# Patient Record
Sex: FEMALE | Race: WHITE | URBAN - METROPOLITAN AREA
[De-identification: names, ages, dates, MRNs, and addresses within clinical notes are randomized per-mention and may not be internally consistent; named-entity substitution may affect disease eponyms.]

---

## 2017-10-02 ENCOUNTER — HISTORICAL (OUTPATIENT)
Dept: LAB | Facility: HOSPITAL | Age: 62
End: 2017-10-02

## 2017-10-04 LAB — FINAL CULTURE: NO GROWTH

## 2018-05-09 ENCOUNTER — HISTORICAL (OUTPATIENT)
Dept: ADMINISTRATIVE | Facility: HOSPITAL | Age: 63
End: 2018-05-09

## 2018-05-09 LAB
CHOLEST SERPL-MCNC: 232 MG/DL (ref 0–200)
CHOLEST/HDLC SERPL: 3.7 {RATIO} (ref 0–4)
HDLC SERPL-MCNC: 63 MG/DL (ref 35–60)
LDLC SERPL CALC-MCNC: 145 MG/DL (ref 0–129)
TRIGL SERPL-MCNC: 118 MG/DL (ref 30–150)
VLDLC SERPL CALC-MCNC: 24 MG/DL

## 2018-11-07 ENCOUNTER — HISTORICAL (OUTPATIENT)
Dept: ADMINISTRATIVE | Facility: HOSPITAL | Age: 63
End: 2018-11-07

## 2018-11-07 LAB
CHOLEST SERPL-MCNC: 239 MG/DL (ref 0–200)
CHOLEST/HDLC SERPL: 3.4 {RATIO} (ref 0–4)
HDLC SERPL-MCNC: 71 MG/DL (ref 35–60)
LDLC SERPL CALC-MCNC: 151 MG/DL (ref 0–129)
TRIGL SERPL-MCNC: 83 MG/DL (ref 30–150)
VLDLC SERPL CALC-MCNC: 17 MG/DL

## 2018-11-09 ENCOUNTER — HISTORICAL (OUTPATIENT)
Dept: ADMINISTRATIVE | Facility: HOSPITAL | Age: 63
End: 2018-11-09

## 2019-01-09 LAB
ABS NEUT (OLG): 2.85 X10(3)/MCL (ref 2.1–9.2)
ERYTHROCYTE [DISTWIDTH] IN BLOOD BY AUTOMATED COUNT: 12.3 % (ref 11.5–17)
HCT VFR BLD AUTO: 37.1 % (ref 37–47)
HGB BLD-MCNC: 11.8 GM/DL (ref 12–16)
MCH RBC QN AUTO: 30.2 PG (ref 27–31)
MCHC RBC AUTO-ENTMCNC: 31.8 GM/DL (ref 33–36)
MCV RBC AUTO: 94.9 FL (ref 80–94)
PLATELET # BLD AUTO: 277 X10(3)/MCL (ref 130–400)
PMV BLD AUTO: 9.7 FL (ref 7.4–10.4)
RBC # BLD AUTO: 3.91 X10(6)/MCL (ref 4.2–5.4)
WBC # SPEC AUTO: 6.2 X10(3)/MCL (ref 4.5–11.5)

## 2019-01-14 ENCOUNTER — HISTORICAL (OUTPATIENT)
Dept: SURGERY | Facility: HOSPITAL | Age: 64
End: 2019-01-14

## 2019-10-03 ENCOUNTER — HISTORICAL (OUTPATIENT)
Dept: ADMINISTRATIVE | Facility: HOSPITAL | Age: 64
End: 2019-10-03

## 2019-10-03 LAB
CHOLEST SERPL-MCNC: 222 MG/DL (ref 0–200)
CHOLEST/HDLC SERPL: 3.4 {RATIO} (ref 0–4)
HDLC SERPL-MCNC: 65 MG/DL (ref 35–60)
LDLC SERPL CALC-MCNC: 145 MG/DL (ref 0–129)
TRIGL SERPL-MCNC: 61 MG/DL (ref 30–150)
VLDLC SERPL CALC-MCNC: 12 MG/DL

## 2019-12-05 ENCOUNTER — HISTORICAL (OUTPATIENT)
Dept: ADMINISTRATIVE | Facility: HOSPITAL | Age: 64
End: 2019-12-05

## 2019-12-05 LAB
FLUAV AG NPH QL IA: NEGATIVE
FLUBV AG NPH QL IA: NEGATIVE

## 2020-11-23 LAB
BUN SERPL-MCNC: 15 MG/DL (ref 7–18)
CALCIUM SERPL-MCNC: 9.8 MG/DL (ref 8.5–10.1)
CHLORIDE SERPL-SCNC: 103 MMOL/L (ref 98–107)
CO2 SERPL-SCNC: 26 MMOL/L (ref 21–32)
CREAT SERPL-MCNC: 0.9 MG/DL (ref 0.6–1.3)
GLUCOSE SERPL-MCNC: 90 MG/DL (ref 74–106)
POTASSIUM SERPL-SCNC: 3.9 MMOL/L (ref 3.5–5.1)
SODIUM SERPL-SCNC: 139 MEQ/L (ref 131–145)

## 2021-02-26 ENCOUNTER — HISTORICAL (OUTPATIENT)
Dept: ADMINISTRATIVE | Facility: HOSPITAL | Age: 66
End: 2021-02-26

## 2021-02-26 LAB
HEMOCCULT SP1 STL QL: NEGATIVE
HEMOCCULT SP2 STL QL: NEGATIVE

## 2021-12-10 ENCOUNTER — HISTORICAL (OUTPATIENT)
Dept: ADMINISTRATIVE | Facility: HOSPITAL | Age: 66
End: 2021-12-10

## 2021-12-10 LAB
ALBUMIN SERPL-MCNC: 3.5 GM/DL (ref 3.4–4.8)
ALBUMIN/GLOB SERPL: 1.2 RATIO (ref 1.1–2)
ALP SERPL-CCNC: 63 UNIT/L (ref 40–150)
ALT SERPL-CCNC: 19 UNIT/L (ref 0–55)
AST SERPL-CCNC: 21 UNIT/L (ref 5–34)
BILIRUB SERPL-MCNC: 0.4 MG/DL
BILIRUBIN DIRECT+TOT PNL SERPL-MCNC: 0.2 MG/DL (ref 0–0.5)
BILIRUBIN DIRECT+TOT PNL SERPL-MCNC: 0.2 MG/DL (ref 0–0.8)
BUN SERPL-MCNC: 8.2 MG/DL (ref 9.8–20.1)
CALCIUM SERPL-MCNC: 9.1 MG/DL (ref 8.7–10.5)
CHLORIDE SERPL-SCNC: 99 MMOL/L (ref 98–107)
CO2 SERPL-SCNC: 30 MMOL/L (ref 23–31)
CREAT SERPL-MCNC: 0.73 MG/DL (ref 0.55–1.02)
ERYTHROCYTE [DISTWIDTH] IN BLOOD BY AUTOMATED COUNT: 11.9 % (ref 11.5–17)
GLOBULIN SER-MCNC: 3 GM/DL (ref 2.4–3.5)
GLUCOSE SERPL-MCNC: 90 MG/DL (ref 82–115)
HCT VFR BLD AUTO: 37.3 % (ref 37–47)
HGB BLD-MCNC: 11.9 GM/DL (ref 12–16)
MCH RBC QN AUTO: 29.5 PG (ref 27–31)
MCHC RBC AUTO-ENTMCNC: 31.9 GM/DL (ref 33–36)
MCV RBC AUTO: 92.6 FL (ref 80–94)
PLATELET # BLD AUTO: 413 X10(3)/MCL (ref 130–400)
PMV BLD AUTO: 9.5 FL (ref 9.4–12.4)
POTASSIUM SERPL-SCNC: 4 MMOL/L (ref 3.5–5.1)
PROT SERPL-MCNC: 6.5 GM/DL (ref 5.8–7.6)
RBC # BLD AUTO: 4.03 X10(6)/MCL (ref 4.2–5.4)
SODIUM SERPL-SCNC: 138 MMOL/L (ref 136–145)
T3FREE SERPL-MCNC: 1.92 PG/ML (ref 1.58–3.91)
T3RU NFR SERPL: 34.92 % (ref 31–39)
T4 FREE SERPL-MCNC: 0.96 NG/DL (ref 0.7–1.48)
T4 SERPL-MCNC: 7.34 UG/DL (ref 4.87–11.72)
TSH SERPL-ACNC: 1.04 UIU/ML (ref 0.35–4.94)
WBC # SPEC AUTO: 6.8 X10(3)/MCL (ref 4.5–11.5)

## 2022-03-04 ENCOUNTER — HISTORICAL (OUTPATIENT)
Dept: ADMINISTRATIVE | Facility: HOSPITAL | Age: 67
End: 2022-03-04

## 2022-04-10 ENCOUNTER — HISTORICAL (OUTPATIENT)
Dept: ADMINISTRATIVE | Facility: HOSPITAL | Age: 67
End: 2022-04-10

## 2022-04-27 VITALS
WEIGHT: 113.75 LBS | HEIGHT: 63 IN | SYSTOLIC BLOOD PRESSURE: 122 MMHG | OXYGEN SATURATION: 99 % | DIASTOLIC BLOOD PRESSURE: 70 MMHG | BODY MASS INDEX: 20.16 KG/M2

## 2022-05-03 NOTE — HISTORICAL [facility] CERNER
This is a historical note converted from [place]. Formatting and pictures may have been removed.  Please reference [place] for original formatting and attached multimedia. Chief Complaint  PT HERE FOR RT THUMB PAIN, SHE HAS PAIN AND POPPING WHEN BENDING HER THUMB, X-RAY TODAY...CV  History of Present Illness  Patient here with complaints of right thumb pain and?popping.  She reports approximately a week to 10 days ago she started having some popping in her finger and some swelling?and last night she used some?treatments with heat and ice and the swelling is going down some but still remains with a pop in her finger.  She?reports no traumatic incidents?to her recollection  Review of Systems  Constitutional: No fever, No chills.  Respiratory: No shortness of breath, No cough.  Cardiovascular: No chest pain.  Gastrointestinal: No nausea, No vomiting, No diarrhea, No constipation, No heartburn.  Genitourinary: No dysuria, No hematuria.  Hematology/Lymphatics: No bleeding tendency.  Endocrine: No polyuria.  Neurologic: Alert and oriented X4, No numbness, No tingling.  Psychiatric: No anxiety, No depression.  Integumentary: ?negative except as documented in history of present illness  Physical Exam  Vitals & Measurements  HR:?64(Peripheral)? BP:?107/64?  HT:?160?cm? HT:?160?cm? WT:?48.98?kg? WT:?48.98?kg? BMI:?19.13?  Right hand exam  Patient has negative grind test  Negative tenderness palpation across the CMC joint  Palpable flexor tendon nodule at the base of the right thumb  Exquisite tenderness palpation of the flexor tendon nodule  Positive triggering of the right thumb  No pain?to palpation of the MCP or IP joint of the thumb  Intact sensation to the?right hand  Negative Tinel  ?  Right thumb x-rays taken today  Degenerative changes of the IP joint of the right thumb?otherwise no bony a normalities  ?   Diagnosis: Right trigger thumb  Plan: The patient does not wish to try an injection at todays visit.  We will try a  course of anti-inflammatories along with a thumb spica brace?for rest of her?right thumb.  She will follow-up in this office in 3 weeks and if her popping and pain persist we will consider an injection at that point  Assessment/Plan  1.?Trigger thumb, right thumb?M65.311  Ordered:  meloxicam, 7.5 mg = 1 tab(s), Oral, Daily, # 30 tab(s), 1 Refill(s), Pharmacy: [facility]  [place]  Clinic Follow up, *Est. 11/30/18 8:00:00 CST, Order for future visit, Trigger thumb, right thumb, [facility]  External Referral, DME, Thumb spica brace right hand/wrist, 11/09/18 9:21:00 CST, Trigger thumb, right thumb  Office/Outpatient Visit Level 3 [id number], Trigger thumb, right thumb, [facility], 11/09/18 9:21:00 CST  ?  Pain of right thumb?M79.644  ?   Problem List/Past Medical History  Ongoing  Hypercholesteremia  Hypothyroidism  Migraine  Palpitations  Trigger thumb, right thumb  Historical  No qualifying data  Procedure/Surgical History  D&C - Dilatation and curettage   Medications  Alendronate Sodium 70 Mg Tab, 70 mg= 1 tab(s), Oral, qWeek  Fioricet oral capsule, 1 cap(s), Oral, q4hr, 1 refills  meloxicam 7.5 mg oral tablet, 7.5 mg= 1 tab(s), Oral, Daily, 1 refills  Oracea 40 mg oral delayed release capsule, 40 mg= 1 cap(s), Oral, Once  UNITHROID 0.05MG (50MCG)TABLETS  UNITHROID 0.075MG (75MCG)TABLETS, 75 mcg= 1 tab(s), Oral  VIT D2 1.25 MG (50,000 UNIT)  Allergies  Zithromax Z-Ramesh?(Unknown)  Social History  Alcohol  Current, 1-2 times per year, 11/06/2018  Employment/School  Retired, Previous employment/school: LPN., 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  Hyperlipidemia.: Mother and Father.  Hypertension.: Mother and Father.  Health Maintenance  Health Maintenance  ???Pending?(in the next year)  ??? ??Due?  ??? ? ? ?ADL Screening due??11/09/18??and every  1??year(s)  ??? ? ? ?Alcohol Misuse Screening due??11/09/18??and every 1??year(s)  ??? ? ? ?Aspirin Therapy for CVD Prevention due??11/09/18??and every 1??year(s)  ??? ? ? ?Tetanus Vaccine due??11/09/18??and every 10??year(s)  ??? ? ? ?Zoster Vaccine due??11/09/18??and every 100??year(s)  ???Satisfied?(in the past 1 year)  ??? ??Satisfied?  ??? ? ? ?Blood Pressure Screening on??11/09/18.??Satisfied by Antonia Napoles LPN  ??? ? ? ?Body Mass Index Check on??11/09/18.??Satisfied by Antonia Napoles LPN  ??? ? ? ?Cervical Cancer Screening on??02/21/18.??Satisfied by Bridget Erazo  ??? ? ? ?Depression Screening on??11/09/18.??Satisfied by Antonia Napoles LPN  ??? ? ? ?Influenza Vaccine on??11/09/18.??Satisfied by Antonia Napoles LPN  ??? ? ? ?Lipid Screening on??11/07/18.??Satisfied by Tamika Cartagena MT  ??? ? ? ?Obesity Screening on??11/09/18.??Satisfied by Antonia Napoles LPN  ?  ?

## 2022-09-17 ENCOUNTER — HISTORICAL (OUTPATIENT)
Dept: ADMINISTRATIVE | Facility: HOSPITAL | Age: 67
End: 2022-09-17

## 2023-08-03 ENCOUNTER — LAB REQUISITION (OUTPATIENT)
Dept: LAB | Facility: HOSPITAL | Age: 68
End: 2023-08-03
Payer: COMMERCIAL

## 2023-08-03 DIAGNOSIS — L30.9 DERMATITIS, UNSPECIFIED: ICD-10-CM

## 2023-08-03 PROCEDURE — 87070 CULTURE OTHR SPECIMN AEROBIC: CPT | Performed by: PHYSICIAN ASSISTANT

## 2023-08-06 LAB — BACTERIA SPEC CULT: NORMAL

## 2023-09-06 NOTE — OP NOTE
DATE OF SURGERY:    01/14/2019    SURGEON:  Juan Pablo Bloom MD  ASSISTANT:  Bk Davis    PREOPERATIVE DIAGNOSIS:  Right trigger thumb.    POSTOPERATIVE DIAGNOSIS:  Right trigger thumb.    OPERATION PERFORMED:  Right trigger thumb release.    PROCEDURE IN DETAIL:  The patient was brought to the operating room and placed on the operative table under general anesthetic.  The right upper extremity was prepped for 10 minutes with Betadine scrub brush, painted with Betadine antiseptic solution, and draped out sterilely.  The arm was exsanguinated and tourniquet was inflated to 250 mmHg.  An incision was made at the base of the thumb, dissecting down.  The annular pulley was identified.  It was freed.  The constriction was freed and the thumb was able to move without any further locking or popping.  The patient had local anesthetic introduced to the soft tissues.  Tourniquet was deflated.  Hemostasis was obtained.  The wound was then closed with interrupted nylon sutures.  A sterile dressing was then applied.  The patient was then taken from surgery to recovery in satisfactory condition.        ______________________________  Juan Pablo Bloom MD    JPS/UN  DD:  01/14/2019  Time:  08:33AM  DT:  01/14/2019  Time:  09:46AM  Job #:  [id number]      Chonodrocutaneous Helical Advancement Flap Text: The defect edges were debeveled with a #15 scalpel blade.  Given the location of the defect and the proximity to free margins a chondrocutaneous helical advancement flap was deemed most appropriate.  Using a sterile surgical marker, the appropriate advancement flap was drawn incorporating the defect and placing the expected incisions within the relaxed skin tension lines where possible.    The area thus outlined was incised deep to adipose tissue with a #15 scalpel blade.  The skin margins were undermined to an appropriate distance in all directions utilizing iris scissors.

## 2024-09-24 ENCOUNTER — HOSPITAL ENCOUNTER (INPATIENT)
Facility: HOSPITAL | Age: 69
LOS: 6 days | Discharge: HOME HEALTH | DRG: 457 | End: 2024-09-30
Attending: EMERGENCY MEDICINE | Admitting: STUDENT IN AN ORGANIZED HEALTH CARE EDUCATION/TRAINING PROGRAM
Payer: COMMERCIAL

## 2024-09-24 DIAGNOSIS — R53.1 GENERALIZED WEAKNESS: ICD-10-CM

## 2024-09-24 DIAGNOSIS — M54.41 ACUTE BILATERAL LOW BACK PAIN WITH BILATERAL SCIATICA: Primary | ICD-10-CM

## 2024-09-24 DIAGNOSIS — M54.42 ACUTE BILATERAL LOW BACK PAIN WITH BILATERAL SCIATICA: Primary | ICD-10-CM

## 2024-09-24 DIAGNOSIS — Z01.818 PRE-OP EVALUATION: ICD-10-CM

## 2024-09-24 DIAGNOSIS — S32.10XA CLOSED FRACTURE OF SACRUM, UNSPECIFIED PORTION OF SACRUM, INITIAL ENCOUNTER: ICD-10-CM

## 2024-09-24 LAB
ABORH RETYPE: NORMAL
ALBUMIN SERPL-MCNC: 3.5 G/DL (ref 3.4–4.8)
ALBUMIN/GLOB SERPL: 1.2 RATIO (ref 1.1–2)
ALP SERPL-CCNC: 190 UNIT/L (ref 40–150)
ALT SERPL-CCNC: 22 UNIT/L (ref 0–55)
AMPHET UR QL SCN: NEGATIVE
ANION GAP SERPL CALC-SCNC: 10 MEQ/L
APTT PPP: 22.7 SECONDS (ref 23.2–33.7)
AST SERPL-CCNC: 27 UNIT/L (ref 5–34)
BACTERIA #/AREA URNS AUTO: ABNORMAL /HPF
BARBITURATE SCN PRESENT UR: NEGATIVE
BASOPHILS # BLD AUTO: 0.05 X10(3)/MCL
BASOPHILS NFR BLD AUTO: 0.5 %
BENZODIAZ UR QL SCN: NEGATIVE
BILIRUB SERPL-MCNC: 0.4 MG/DL
BILIRUB UR QL STRIP.AUTO: NEGATIVE
BUN SERPL-MCNC: 14.6 MG/DL (ref 9.8–20.1)
CALCIUM SERPL-MCNC: 9.2 MG/DL (ref 8.4–10.2)
CANNABINOIDS UR QL SCN: NEGATIVE
CHLORIDE SERPL-SCNC: 98 MMOL/L (ref 98–107)
CK SERPL-CCNC: 165 U/L (ref 29–168)
CLARITY UR: CLEAR
CO2 SERPL-SCNC: 24 MMOL/L (ref 23–31)
COCAINE UR QL SCN: NEGATIVE
COLOR UR AUTO: COLORLESS
CREAT SERPL-MCNC: 0.67 MG/DL (ref 0.55–1.02)
CREAT/UREA NIT SERPL: 22
EOSINOPHIL # BLD AUTO: 0.03 X10(3)/MCL (ref 0–0.9)
EOSINOPHIL NFR BLD AUTO: 0.3 %
ERYTHROCYTE [DISTWIDTH] IN BLOOD BY AUTOMATED COUNT: 12 % (ref 11.5–17)
ETHANOL SERPL-MCNC: <10 MG/DL
FENTANYL UR QL SCN: NEGATIVE
GFR SERPLBLD CREATININE-BSD FMLA CKD-EPI: >60 ML/MIN/1.73/M2
GLOBULIN SER-MCNC: 2.9 GM/DL (ref 2.4–3.5)
GLUCOSE SERPL-MCNC: 97 MG/DL (ref 82–115)
GLUCOSE UR QL STRIP: NORMAL
GROUP & RH: NORMAL
HCT VFR BLD AUTO: 39.2 % (ref 37–47)
HGB BLD-MCNC: 12.9 G/DL (ref 12–16)
HGB UR QL STRIP: NEGATIVE
IMM GRANULOCYTES # BLD AUTO: 0.04 X10(3)/MCL (ref 0–0.04)
IMM GRANULOCYTES NFR BLD AUTO: 0.4 %
INDIRECT COOMBS: NORMAL
INR PPP: 0.9
KETONES UR QL STRIP: ABNORMAL
LEUKOCYTE ESTERASE UR QL STRIP: NEGATIVE
LYMPHOCYTES # BLD AUTO: 1.62 X10(3)/MCL (ref 0.6–4.6)
LYMPHOCYTES NFR BLD AUTO: 17.5 %
MAGNESIUM SERPL-MCNC: 2.2 MG/DL (ref 1.6–2.6)
MCH RBC QN AUTO: 29.9 PG (ref 27–31)
MCHC RBC AUTO-ENTMCNC: 32.9 G/DL (ref 33–36)
MCV RBC AUTO: 91 FL (ref 80–94)
MDMA UR QL SCN: NEGATIVE
MONOCYTES # BLD AUTO: 0.91 X10(3)/MCL (ref 0.1–1.3)
MONOCYTES NFR BLD AUTO: 9.8 %
NEUTROPHILS # BLD AUTO: 6.63 X10(3)/MCL (ref 2.1–9.2)
NEUTROPHILS NFR BLD AUTO: 71.5 %
NITRITE UR QL STRIP: NEGATIVE
NRBC BLD AUTO-RTO: 0 %
OPIATES UR QL SCN: POSITIVE
PCP UR QL: NEGATIVE
PH UR STRIP: 5.5 [PH]
PH UR: 5.5 [PH] (ref 3–11)
PLATELET # BLD AUTO: 476 X10(3)/MCL (ref 130–400)
PMV BLD AUTO: 8.1 FL (ref 7.4–10.4)
POCT GLUCOSE: 98 MG/DL (ref 70–110)
POTASSIUM SERPL-SCNC: 3.8 MMOL/L (ref 3.5–5.1)
PROT SERPL-MCNC: 6.4 GM/DL (ref 5.8–7.6)
PROT UR QL STRIP: NEGATIVE
PROTHROMBIN TIME: 12.6 SECONDS (ref 12.5–14.5)
RBC # BLD AUTO: 4.31 X10(6)/MCL (ref 4.2–5.4)
RBC #/AREA URNS AUTO: ABNORMAL /HPF
SODIUM SERPL-SCNC: 132 MMOL/L (ref 136–145)
SP GR UR STRIP.AUTO: 1.01 (ref 1–1.03)
SPECIFIC GRAVITY, URINE AUTO (.000) (OHS): 1.01 (ref 1–1.03)
SPECIMEN OUTDATE: NORMAL
SQUAMOUS #/AREA URNS LPF: ABNORMAL /HPF
T3FREE SERPL-MCNC: 2.5 PG/ML (ref 1.58–3.91)
T4 FREE SERPL-MCNC: 1.15 NG/DL (ref 0.7–1.48)
TROPONIN I SERPL-MCNC: <0.01 NG/ML (ref 0–0.04)
TSH SERPL-ACNC: 5.24 UIU/ML (ref 0.35–4.94)
UROBILINOGEN UR STRIP-ACNC: NORMAL
WBC # BLD AUTO: 9.28 X10(3)/MCL (ref 4.5–11.5)
WBC #/AREA URNS AUTO: ABNORMAL /HPF

## 2024-09-24 PROCEDURE — 82962 GLUCOSE BLOOD TEST: CPT

## 2024-09-24 PROCEDURE — 85610 PROTHROMBIN TIME: CPT | Performed by: EMERGENCY MEDICINE

## 2024-09-24 PROCEDURE — 82077 ASSAY SPEC XCP UR&BREATH IA: CPT | Performed by: EMERGENCY MEDICINE

## 2024-09-24 PROCEDURE — 86850 RBC ANTIBODY SCREEN: CPT | Performed by: ORTHOPAEDIC SURGERY

## 2024-09-24 PROCEDURE — 96374 THER/PROPH/DIAG INJ IV PUSH: CPT

## 2024-09-24 PROCEDURE — 81015 MICROSCOPIC EXAM OF URINE: CPT | Performed by: EMERGENCY MEDICINE

## 2024-09-24 PROCEDURE — 25000003 PHARM REV CODE 250: Performed by: PHYSICIAN ASSISTANT

## 2024-09-24 PROCEDURE — 82550 ASSAY OF CK (CPK): CPT | Performed by: EMERGENCY MEDICINE

## 2024-09-24 PROCEDURE — 96375 TX/PRO/DX INJ NEW DRUG ADDON: CPT

## 2024-09-24 PROCEDURE — 85730 THROMBOPLASTIN TIME PARTIAL: CPT | Performed by: EMERGENCY MEDICINE

## 2024-09-24 PROCEDURE — 86901 BLOOD TYPING SEROLOGIC RH(D): CPT | Performed by: ORTHOPAEDIC SURGERY

## 2024-09-24 PROCEDURE — 99285 EMERGENCY DEPT VISIT HI MDM: CPT | Mod: 25

## 2024-09-24 PROCEDURE — 63600175 PHARM REV CODE 636 W HCPCS: Performed by: EMERGENCY MEDICINE

## 2024-09-24 PROCEDURE — 11000001 HC ACUTE MED/SURG PRIVATE ROOM

## 2024-09-24 PROCEDURE — 80053 COMPREHEN METABOLIC PANEL: CPT | Performed by: EMERGENCY MEDICINE

## 2024-09-24 PROCEDURE — 84443 ASSAY THYROID STIM HORMONE: CPT | Performed by: EMERGENCY MEDICINE

## 2024-09-24 PROCEDURE — 84484 ASSAY OF TROPONIN QUANT: CPT | Performed by: EMERGENCY MEDICINE

## 2024-09-24 PROCEDURE — 84439 ASSAY OF FREE THYROXINE: CPT | Performed by: EMERGENCY MEDICINE

## 2024-09-24 PROCEDURE — 80307 DRUG TEST PRSMV CHEM ANLYZR: CPT | Performed by: EMERGENCY MEDICINE

## 2024-09-24 PROCEDURE — 25000003 PHARM REV CODE 250: Performed by: NURSE PRACTITIONER

## 2024-09-24 PROCEDURE — 86900 BLOOD TYPING SEROLOGIC ABO: CPT | Performed by: ORTHOPAEDIC SURGERY

## 2024-09-24 PROCEDURE — 83735 ASSAY OF MAGNESIUM: CPT | Performed by: EMERGENCY MEDICINE

## 2024-09-24 PROCEDURE — 85025 COMPLETE CBC W/AUTO DIFF WBC: CPT | Performed by: EMERGENCY MEDICINE

## 2024-09-24 PROCEDURE — 63600175 PHARM REV CODE 636 W HCPCS: Performed by: PHYSICIAN ASSISTANT

## 2024-09-24 PROCEDURE — 81001 URINALYSIS AUTO W/SCOPE: CPT | Performed by: EMERGENCY MEDICINE

## 2024-09-24 PROCEDURE — 84481 FREE ASSAY (FT-3): CPT | Performed by: EMERGENCY MEDICINE

## 2024-09-24 RX ORDER — IBUPROFEN 200 MG
16 TABLET ORAL
Status: DISCONTINUED | OUTPATIENT
Start: 2024-09-24 | End: 2024-09-30 | Stop reason: HOSPADM

## 2024-09-24 RX ORDER — MORPHINE SULFATE 4 MG/ML
2 INJECTION, SOLUTION INTRAMUSCULAR; INTRAVENOUS
Status: COMPLETED | OUTPATIENT
Start: 2024-09-24 | End: 2024-09-24

## 2024-09-24 RX ORDER — BUSPIRONE HYDROCHLORIDE 5 MG/1
5 TABLET ORAL 2 TIMES DAILY
COMMUNITY
Start: 2024-08-31

## 2024-09-24 RX ORDER — TALC
6 POWDER (GRAM) TOPICAL NIGHTLY PRN
Status: DISCONTINUED | OUTPATIENT
Start: 2024-09-24 | End: 2024-09-30 | Stop reason: HOSPADM

## 2024-09-24 RX ORDER — ACETAMINOPHEN 325 MG/1
650 TABLET ORAL EVERY 8 HOURS PRN
Status: DISCONTINUED | OUTPATIENT
Start: 2024-09-24 | End: 2024-09-30 | Stop reason: HOSPADM

## 2024-09-24 RX ORDER — HYDROCODONE BITARTRATE AND ACETAMINOPHEN 7.5; 325 MG/1; MG/1
1 TABLET ORAL EVERY 4 HOURS PRN
Status: DISPENSED | OUTPATIENT
Start: 2024-09-24 | End: 2024-09-26

## 2024-09-24 RX ORDER — ACETAMINOPHEN 325 MG/1
650 TABLET ORAL EVERY 4 HOURS PRN
Status: DISCONTINUED | OUTPATIENT
Start: 2024-09-24 | End: 2024-09-30 | Stop reason: HOSPADM

## 2024-09-24 RX ORDER — ENOXAPARIN SODIUM 100 MG/ML
40 INJECTION SUBCUTANEOUS EVERY 24 HOURS
Status: DISCONTINUED | OUTPATIENT
Start: 2024-09-24 | End: 2024-09-26

## 2024-09-24 RX ORDER — LEVOTHYROXINE SODIUM 50 UG/1
88 TABLET ORAL
COMMUNITY

## 2024-09-24 RX ORDER — GLUCAGON 1 MG
1 KIT INJECTION
Status: DISCONTINUED | OUTPATIENT
Start: 2024-09-24 | End: 2024-09-30 | Stop reason: HOSPADM

## 2024-09-24 RX ORDER — LEVOTHYROXINE SODIUM 50 UG/1
5 TABLET ORAL
COMMUNITY

## 2024-09-24 RX ORDER — BUSPIRONE HYDROCHLORIDE 5 MG/1
5 TABLET ORAL 2 TIMES DAILY
Status: DISCONTINUED | OUTPATIENT
Start: 2024-09-24 | End: 2024-09-30 | Stop reason: HOSPADM

## 2024-09-24 RX ORDER — IBUPROFEN 200 MG
24 TABLET ORAL
Status: DISCONTINUED | OUTPATIENT
Start: 2024-09-24 | End: 2024-09-30 | Stop reason: HOSPADM

## 2024-09-24 RX ORDER — SODIUM CHLORIDE 0.9 % (FLUSH) 0.9 %
10 SYRINGE (ML) INJECTION EVERY 12 HOURS PRN
Status: DISCONTINUED | OUTPATIENT
Start: 2024-09-24 | End: 2024-09-30 | Stop reason: HOSPADM

## 2024-09-24 RX ORDER — METHOCARBAMOL 100 MG/ML
1000 INJECTION, SOLUTION INTRAMUSCULAR; INTRAVENOUS ONCE
Status: COMPLETED | OUTPATIENT
Start: 2024-09-24 | End: 2024-09-24

## 2024-09-24 RX ORDER — ONDANSETRON HYDROCHLORIDE 2 MG/ML
4 INJECTION, SOLUTION INTRAVENOUS EVERY 4 HOURS PRN
Status: DISCONTINUED | OUTPATIENT
Start: 2024-09-24 | End: 2024-09-30 | Stop reason: HOSPADM

## 2024-09-24 RX ORDER — ONDANSETRON HYDROCHLORIDE 2 MG/ML
4 INJECTION, SOLUTION INTRAVENOUS
Status: COMPLETED | OUTPATIENT
Start: 2024-09-24 | End: 2024-09-24

## 2024-09-24 RX ADMIN — ENOXAPARIN SODIUM 40 MG: 40 INJECTION SUBCUTANEOUS at 07:09

## 2024-09-24 RX ADMIN — HYDROCODONE BITARTRATE AND ACETAMINOPHEN 1 TABLET: 7.5; 325 TABLET ORAL at 06:09

## 2024-09-24 RX ADMIN — MORPHINE SULFATE 2 MG: 4 INJECTION, SOLUTION INTRAMUSCULAR; INTRAVENOUS at 11:09

## 2024-09-24 RX ADMIN — METHOCARBAMOL 1000 MG: 100 INJECTION INTRAMUSCULAR; INTRAVENOUS at 11:09

## 2024-09-24 RX ADMIN — ONDANSETRON 4 MG: 2 INJECTION INTRAMUSCULAR; INTRAVENOUS at 11:09

## 2024-09-24 RX ADMIN — Medication 6 MG: at 11:09

## 2024-09-24 NOTE — ED PROVIDER NOTES
Encounter Date: 9/24/2024    SCRIBE #1 NOTE: I, Raoul Back, am scribing for, and in the presence of,  Katty Chávez MD. I have scribed the following portions of the note - Other sections scribed: HPI,ROS,PE.     History     Chief Complaint   Patient presents with    Hip Pain     Pt co back pain radiating to hips and knees. Pt standing, but states she can't walk for past month.    Altered Mental Status     Pt confused and slow to respond to answer questions.  states pt has not been sleeping. CBG 98mg/dl.      67 y/o female with PMHx of HLD, hypothyroidism, and anxiety presents to ED c/o bilateral hip pain onset ~1x month. Pt reports she has been having a burning pain to her hips, radiating down her thighs that gets worse throughout the day. She also complains of intermittent episodes of numbness to her posterior thighs and feet. She denies having any recent falls or trauma. She denies any difficulty urinating, but states it is more painful to sit down. She reports taking tylenol and advil for the pain. She reports she hasn't been sleeping well due to the pain. She reports she is eating and drinking normally.      at bedside reports pt pulled her pelvic floor muscles ~3x months ago, and has been doing physical therapy.  reports pt has been using a walker to ambulate, but has been more difficult due to her bilateral hip pain.  reports recently she has been crawling on the floor to get around, or needs to have help ambulating.     The history is provided by the patient and the spouse. No  was used.     Review of patient's allergies indicates:   Allergen Reactions    Beta-blockers (beta-adrenergic blocking agts)     Statins-hmg-coa reductase inhibitors      Past Medical History:   Diagnosis Date    Hypercholesteremia     Hypothyroidism, unspecified     Migraine     Palpitations     Trigger thumb, right thumb      Past Surgical History:   Procedure  Laterality Date    Caesarean section      COLONOSCOPY      D&C - Dilatation and curettage      History of esophagogastroduodenoscopy (EGD)      Release Right Hand Tendon, Open Approach (01/14/2019)      Release Trigger Finger Or Thumb (Right) (01/14/2019)      Tendon sheath incision (eg, for trigger finger) (01/14/2019)       No family history on file.  Social History     Tobacco Use    Smoking status: Never    Smokeless tobacco: Never   Substance Use Topics    Alcohol use: Yes     Alcohol/week: 1.0 standard drink of alcohol     Types: 1 Standard drinks or equivalent per week     Comment: Occasionally    Drug use: Never     Review of Systems   Constitutional:  Negative for fever.        Hasn't been sleeping well due to pain.    Genitourinary:  Negative for difficulty urinating.   Musculoskeletal:  Positive for arthralgias (a burning pain to bilateral hips radiating down thighs) and myalgias (a burning pain to bilateral hips radiating to thighs).   Neurological:  Positive for numbness (intermittent numbness to her BLE).       Physical Exam     Initial Vitals [09/24/24 0951]   BP Pulse Resp Temp SpO2   (!) 147/83 87 20 98.3 °F (36.8 °C) 99 %      MAP       --         Physical Exam    Nursing note and vitals reviewed.  Constitutional: No distress.   HENT:   Head: Normocephalic and atraumatic.   Eyes: Conjunctivae and EOM are normal. Pupils are equal, round, and reactive to light.   Neck: Trachea normal. Neck supple.   Normal range of motion.  Cardiovascular:  Normal rate, regular rhythm and normal heart sounds.           No murmur heard.  Intact distal pulses.    Pulmonary/Chest: Breath sounds normal. No respiratory distress. She exhibits no tenderness.   Abdominal: Abdomen is soft. Bowel sounds are normal. There is no abdominal tenderness.   Musculoskeletal:         General: No tenderness (no midline lumbar spine tenderness).      Cervical back: Normal range of motion and neck supple.      Lumbar back:  Normal. Normal range of motion.      Comments: ROM to BLE limited due to pain.      Neurological: She is alert and oriented to person, place, and time. She has normal strength. No cranial nerve deficit or sensory deficit.   Slow to answer questions.    Skin: Skin is warm and dry.   Psychiatric: Her mood appears anxious. Her speech is tangential.       ED Course   Procedures  Labs Reviewed   COMPREHENSIVE METABOLIC PANEL - Abnormal       Result Value    Sodium 132 (*)     Potassium 3.8      Chloride 98      CO2 24      Glucose 97      Blood Urea Nitrogen 14.6      Creatinine 0.67      Calcium 9.2      Protein Total 6.4      Albumin 3.5      Globulin 2.9      Albumin/Globulin Ratio 1.2      Bilirubin Total 0.4       (*)     ALT 22      AST 27      eGFR >60      Anion Gap 10.0      BUN/Creatinine Ratio 22     TSH - Abnormal    TSH 5.238 (*)    APTT - Abnormal    PTT 22.7 (*)    CBC WITH DIFFERENTIAL - Abnormal    WBC 9.28      RBC 4.31      Hgb 12.9      Hct 39.2      MCV 91.0      MCH 29.9      MCHC 32.9 (*)     RDW 12.0      Platelet 476 (*)     MPV 8.1      Neut % 71.5      Lymph % 17.5      Mono % 9.8      Eos % 0.3      Basophil % 0.5      Lymph # 1.62      Neut # 6.63      Mono # 0.91      Eos # 0.03      Baso # 0.05      IG# 0.04      IG% 0.4      NRBC% 0.0     TROPONIN I - Normal    Troponin-I <0.010     PROTIME-INR - Normal    PT 12.6      INR 0.9     MAGNESIUM - Normal    Magnesium Level 2.20     CK - Normal    Creatine Kinase 165     ALCOHOL,MEDICAL (ETHANOL) - Normal    Ethanol Level <10.0     T4, FREE - Normal    Thyroxine Free 1.15     T3,FREE ([facility] ONLY) - Normal    T3 Free 2.50     CBC W/ AUTO DIFFERENTIAL    Narrative:     The following orders were created for panel order CBC auto differential.  Procedure                               Abnormality         Status                     ---------                               -----------         ------                     CBC with  Differential[[id number]]       Abnormal            Final result                 Please view results for these tests on the individual orders.   URINALYSIS, REFLEX TO URINE CULTURE   DRUG SCREEN, URINE (BEAKER)          Imaging Results              CT Lumbar Spine Without Contrast (Final result)  Result time 09/24/24 11:13:53      Final result by Harvinder Dutton MD (09/24/24 11:13:53)                   Impression:      1. Bilateral L5 transverse processes displaced fractures.    2. Bilateral complex comminuted fractures of the sacrum.      Electronically signed by: Harvinder Dutton  Date:    09/24/2024  Time:    11:13               Narrative:    EXAMINATION:  CT LUMBAR SPINE WITHOUT CONTRAST    CLINICAL HISTORY:  Low back pain, symptoms persist with > 6wks conservative treatment;    TECHNIQUE:  Multidetector axial images were performed of the lumbar spine without contrast and the images were reformatted.    Dose length product of 539 mGycm. Automated exposure control was utilized to minimize radiation dose.    COMPARISON:  None available    FINDINGS:  There are bilateral displaced fractures of the transverse processes of L5.  There are bilateral complex comminuted fractures of the sacrumseen from axial image 102 to image 118 series 8.  Fracture line of the right sacrum disrupts the anterior margin of the right sacral foramen on image 117 series 8.  Similarly, fracture line of left sacrum focally disrupts the anterior aspect of sacral foramen.  There is no significant distortion of bilateral sacral foramen configuration.  Fracture lines also extend to the sacroiliac joints without significant diastasis or dislocation.  There is anterolisthesis of S1 on S2.  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  Demineralization of the bones.    Disc segmental analysis is given below:    At L1-L2, disc is unremarkable.  Central canal is not stenosed and there are no narrowings of the neural  foramen.    At L2-L3, there is bulging of annulus fibrosis which indents the ventral thecal sac.  Bilateral facet arthropathy.  Central canal stenosis is minimal.  There are no narrowings are neural foramen.    At L3-L4, there is generalized disc bulge, ligamentum flavum thickening and facet arthropathy resulting in marked central canal stenosis.  There are no significant narrowings of the neural foramen.    At L4-L5, there is broad disc protrusion, ligamentum flavum thickening and facet arthropathy resulting in marked central canal stenosis.  Right neural foramen is patent.  There is moderate narrowing of the left neural foramen.    At L5-S1, there is left paracentral disc extrusion which mildly narrows the left neural foramen.  Right neural foramen is patent.  No significant central canal stenosis.    Right kidney medial cortical hypodensities are probable cysts.                                       CT Head Without Contrast (Final result)  Result time 09/24/24 10:52:54      Final result by Jean Pierre Red MD (09/24/24 10:52:54)                   Impression:      No acute intracranial findings.      Electronically signed by: Jean Pierre Red  Date:    09/24/2024  Time:    10:52               Narrative:    EXAMINATION:  CT HEAD WITHOUT CONTRAST    CLINICAL HISTORY:  Mental status change, unknown cause;    TECHNIQUE:  CT imaging of the head performed from the skull base to the vertex without intravenous contrast.   mGycm. Automatic exposure control, adjustment of mA/kV or iterative reconstruction technique was used to reduce radiation.    COMPARISON:  None Available.    FINDINGS:  There is no acute cortical infarct, hemorrhage or mass lesion.  There is mild patchy hypoattenuation in the cerebral white matter which is nonspecific but most commonly associated with chronic small vessel ischemic changes.  The ventricles are not significantly enlarged.  Mild vascular calcifications.    Visualized paranasal sinuses  and mastoid air cells are clear.                                       Medications   morphine injection 2 mg (2 mg Intravenous Given 9/24/24 1127)   ondansetron injection 4 mg (4 mg Intravenous Given 9/24/24 1128)   methocarbamoL injection 1,000 mg (1,000 mg Intravenous Given 9/24/24 1127)     Medical Decision Making  The differential diagnosis includes, but is not limited to, sciatica, debility, fracture, deconditioning, electrolyte abnormality, and rhabdomyolysis.acs, thyroid dysfunction   Cbc, cmp, trop, ck, ua, coags, ct head and ct l spin eordered and reviewed  Imaging with sacral fracture  Symptoms worsening over three weeks, suspect inciting incident just prior to this. Consult to ortho given displaced sacral fractures, transverse process fractures require no intervention  Admit for pain control and PT     Problems Addressed:  Acute bilateral low back pain with bilateral sciatica: acute illness or injury that poses a threat to life or bodily functions  Closed fracture of sacrum, unspecified portion of sacrum, initial encounter: acute illness or injury that poses a threat to life or bodily functions  Generalized weakness: acute illness or injury that poses a threat to life or bodily functions    Amount and/or Complexity of Data Reviewed  Independent Historian: spouse     Details:  at bedside reports pt pulled her pelvic floor muscles ~3x months ago, and has been doing physical therapy.  reports pt has been using a walker to ambulate, but has been more difficult due to her bilateral hip pain.  reports recently she has been crawling on the floor to get around, or needs to have help ambulating.   External Data Reviewed: notes.     Details: Outpt visits for cervicalgia, tinnitis  Labs: ordered. Decision-making details documented in ED Course.  Radiology: ordered. Decision-making details documented in ED Course.    Risk  OTC drugs.  Prescription drug management.  Parenteral controlled  substances.  Decision regarding hospitalization.            Scribe Attestation:   Scribe #1: I performed the above scribed service and the documentation accurately describes the services I performed. I attest to the accuracy of the note.  Comments: Attending:   Physician Attestation Statement for Scribe #1: Katty BROTHERS MD, personally performed the services described in this documentation. All medical record entries made by the scribe were at my direction and in my presence.  I have reviewed the chart and agree that the record reflects my personal performance and is accurate and complete.        Attending Attestation:           Physician Attestation for Scribe:  Physician Attestation Statement for Scribe #1: Kyler BROTHERS Brooke R, MD, reviewed documentation, as scribed by Raoul Back in my presence, and it is both accurate and complete.           ED Course as of 09/24/24 1255   Tue Sep 24, 2024   1136 Transverse process fractures do not require any intervention. Sent patient information to orthopedic surgery. Injury appears to have been about 3 weeks ago therefore will plan to admit to medicine for pain control and PT [BS]   1150 Updated pt and spouse [BS]      ED Course User Index  [BS] Katty Chávez MD                           Clinical Impression:  Final diagnoses:  [M54.42, M54.41] Acute bilateral low back pain with bilateral sciatica (Primary)  [R53.1] Generalized weakness  [S32.10XA] Closed fracture of sacrum, unspecified portion of sacrum, initial encounter          ED Disposition Condition    Admit Stable                Katty Chávez MD  09/24/24 2613

## 2024-09-24 NOTE — FIRST PROVIDER EVALUATION
Medical screening examination initiated.  I have conducted a focused provider triage encounter, findings are as follows:    Brief history of present illness:  Patient states bilateral hip and leg pain x 1 month. Denies any injury or trauma.     There were no vitals filed for this visit.    Pertinent physical exam:  Awake, alert    Brief workup plan:  Imaging    Preliminary workup initiated; this workup will be continued and followed by the physician or advanced practice provider that is assigned to the patient when roomed.

## 2024-09-24 NOTE — H&P
[facility] Medicine History & Physical Examination       Patient Name: Su Hall  MRN: [id number]  Patient Class: IP- Inpatient   Admission Date: 9/24/2024   Admitting Physician: China Reid DO   Length of Stay: 0  Attending Physician: China Reid DO   Primary Care Provider: Alessia Boston MD  Face-to-Face encounter date: 09/24/2024  Code Status: Full code   Chief Complaint: Hip Pain (Pt co back pain radiating to hips and knees. Pt standing, but states she can't walk for past month.) and Altered Mental Status (Pt confused and slow to respond to answer questions.  states pt has not been sleeping. CBG 98mg/dl. )        Patient information was obtained from patient, patient's family, past medical records and ER records.     HISTORY OF PRESENT ILLNESS:   Su Hall is a 68 y.o. White female with a past medical history of hypertension, hyperlipidemia, anxiety/depression, hypothyroidism. The patient presented to [facility] on 9/24/2024 with a primary complaint of bilateral hip pain for the last month with intermittent numbness down the legs bilaterally.    Patient has been doing physical therapy for the last 3 months due to a pulled Plavix floor muscle.  She has been using a walker to ambulate but now is having difficulty ambulating with the walker.    Upon presentation to the ED, temperature 98.3° F, heart rate 87, blood pressure 147/83, respiratory rate 20 and SpO2 99% on room air.  Labs with sodium 132, alkaline phosphatase 190, , troponin less than 0.01, TSH 5.2, alcohol level less than 10.  CT of the head with no acute intracranial findings.  CT of the lumbar spine with bilateral L5 transverse process fractures which are displaced and bilateral complex comminuted fractures of the sacrum.  CT of the pelvis revealed comminuted fracture of the sacrum with anterolisthesis of S1 on S2 measuring 9.4 mm, subtitle fracture of the pubic symphysis of the  right side inferiorly and transverse process fracture at L5 on the right and left side.  In the ED patient received methocarbamol, morphine and Zofran.  She is admitted to hospital medicine services for further medical management.    PAST MEDICAL HISTORY:     Past Medical History:   Diagnosis Date    Hypercholesteremia     Hypothyroidism, unspecified     Migraine     Palpitations     Trigger thumb, right thumb        PAST SURGICAL HISTORY:     Past Surgical History:   Procedure Laterality Date    Caesarean section      COLONOSCOPY      D&C - Dilatation and curettage      History of esophagogastroduodenoscopy (EGD)      Release Right Hand Tendon, Open Approach (01/14/2019)      Release Trigger Finger Or Thumb (Right) (01/14/2019)      Tendon sheath incision (eg, for trigger finger) (01/14/2019)         ALLERGIES:   Beta-blockers (beta-adrenergic blocking agts) and Statins-hmg-coa reductase inhibitors    FAMILY HISTORY:   Mother and father with cardiovascular disease    SOCIAL HISTORY:     Social History     Tobacco Use    Smoking status: Never    Smokeless tobacco: Never   Substance Use Topics    Alcohol use: Yes     Alcohol/week: 1.0 standard drink of alcohol     Types: 1 Standard drinks or equivalent per week     Comment: Occasionally      Denies tobacco, alcohol and illicit drug    Screening for Social Drivers for health:  Patient screened for food insecurity, housing instability, transportation needs, utility difficulties, and interpersonal safety (select all that apply as identified as concern)  []Housing or Food  []Transportation Needs  []Utility Difficulties  []Interpersonal safety  [x]None    HOME MEDICATIONS:   As documented    REVIEW OF SYSTEMS:   Except as documented, all other systems reviewed and negative     PHYSICAL EXAM:     VITAL SIGNS: 24 HRS MIN & MAX LAST   Temp  Min: 98.3 °F (36.8 °C)  Max: 98.3 °F (36.8 °C) 98.3 °F (36.8 °C)   BP  Min: 123/73  Max: 147/83 132/78   Pulse  Min: 70  Max: 87  78    Resp  Min: 10  Max: 20 15   SpO2  Min: 96 %  Max: 100 % 99 %       General appearance: Thin female in no apparent distress.  at bedside.  HEENT: Atraumatic head. Moist mucous membranes of oral cavity.  Lungs:  Symmetrical chest expansion.  Nonlabored respirations.  Heart: Regular rate and rhythm per telemetry monitoring.  Abdomen: Non-distended.   Extremities: No cyanosis, clubbing. No deformities.  Skin: No Rash. Warm and dry.  Neuro: Awake, alert and oriented. Motor and sensory exams grossly intact.  Moving around in bed without showing signs of pain.  Psych/mental status: Appropriate mood and affect. Cooperative.  Frequently asks to repeat questions and answers. Rapid speech.  Flight of ideas. Responds appropriately to questions.       LABS AND IMAGING:     Recent Labs   Lab 09/24/24  1055   WBC 9.28   RBC 4.31   HGB 12.9   HCT 39.2   MCV 91.0   MCH 29.9   MCHC 32.9*   RDW 12.0   *   MPV 8.1       Recent Labs   Lab 09/24/24  1055   *   K 3.8   CL 98   CO2 24   BUN 14.6   CREATININE 0.67   CALCIUM 9.2   MG 2.20   ALBUMIN 3.5   ALKPHOS 190*   ALT 22   AST 27   BILITOT 0.4       Microbiology Results (last 7 days)       ** No results found for the last 168 hours. **             CT Pelvis Without Contrast  Narrative: EXAMINATION:  CT PELVIS WITHOUT CONTRAST    CLINICAL HISTORY:  Pelvis fxs; Unspecified fracture of sacrum, initial encounter for closed fracture    TECHNIQUE:  Low dose axial images, sagittal and coronal reformations were obtained from iliac crest to the pubic symphysis.  No contrast was administered.  Automatic exposure control is utilized to reduce patient radiation exposure.    COMPARISON:  None    FINDINGS:  The iliac bone is intact bilaterally.    There is a subtle area of irregularity along the pubis symphysis on the right side inferiorly possibly representing a subtle fracture.  The ischium is otherwise intact on the right and left side.    Acetabulum is intact  bilaterally.    The right and left proximal femurs are intact.  No fracture is seen.  No dislocation is seen.    There is a comminuted fracture of the sacral ala on the right and left side.  Fracture extends into midbody of S1.  There is anterolisthesis of S1 on S2 that measures 9.4 mm.    There is a fracture of the transverse process of L5 on the right and left side.    The bones are diffusely osteoporotic.    Intrapelvic structures show no evidence of hematoma or fluid collection.  No free air is seen.  Visualized portion of the bowel appear unremarkable.  The urinary bladder is distended.  Impression: Comminuted fracture of the sacrum as outlined above with anterolisthesis of S1 on S2 measuring 9.4 mm    Subtle fracture seen along the pubic symphysis on the right side inferiorly    Transverse process fracture at L5 on the right and left side    Distended urinary bladder    Electronically signed by: Eugenio Nolen  Date:    09/24/2024  Time:    14:07  CT Lumbar Spine Without Contrast  Narrative: EXAMINATION:  CT LUMBAR SPINE WITHOUT CONTRAST    CLINICAL HISTORY:  Low back pain, symptoms persist with > 6wks conservative treatment;    TECHNIQUE:  Multidetector axial images were performed of the lumbar spine without contrast and the images were reformatted.    Dose length product of 539 mGycm. Automated exposure control was utilized to minimize radiation dose.    COMPARISON:  None available    FINDINGS:  There are bilateral displaced fractures of the transverse processes of L5.  There are bilateral complex comminuted fractures of the sacrumseen from axial image 102 to image 118 series 8.  Fracture line of the right sacrum disrupts the anterior margin of the right sacral foramen on image 117 series 8.  Similarly, fracture line of left sacrum focally disrupts the anterior aspect of sacral foramen.  There is no significant distortion of bilateral sacral foramen configuration.  Fracture lines also extend to the sacroiliac  joints without significant diastasis or dislocation.  There is anterolisthesis of S1 on S2.  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  Demineralization of the bones.    Disc segmental analysis is given below:    At L1-L2, disc is unremarkable.  Central canal is not stenosed and there are no narrowings of the neural foramen.    At L2-L3, there is bulging of annulus fibrosis which indents the ventral thecal sac.  Bilateral facet arthropathy.  Central canal stenosis is minimal.  There are no narrowings are neural foramen.    At L3-L4, there is generalized disc bulge, ligamentum flavum thickening and facet arthropathy resulting in marked central canal stenosis.  There are no significant narrowings of the neural foramen.    At L4-L5, there is broad disc protrusion, ligamentum flavum thickening and facet arthropathy resulting in marked central canal stenosis.  Right neural foramen is patent.  There is moderate narrowing of the left neural foramen.    At L5-S1, there is left paracentral disc extrusion which mildly narrows the left neural foramen.  Right neural foramen is patent.  No significant central canal stenosis.    Right kidney medial cortical hypodensities are probable cysts.  Impression: 1. Bilateral L5 transverse processes displaced fractures.    2. Bilateral complex comminuted fractures of the sacrum.    Electronically signed by: Harvinder Dutton  Date:    09/24/2024  Time:    11:13  CT Head Without Contrast  Narrative: EXAMINATION:  CT HEAD WITHOUT CONTRAST    CLINICAL HISTORY:  Mental status change, unknown cause;    TECHNIQUE:  CT imaging of the head performed from the skull base to the vertex without intravenous contrast.   mGycm. Automatic exposure control, adjustment of mA/kV or iterative reconstruction technique was used to reduce radiation.    COMPARISON:  None Available.    FINDINGS:  There is no acute cortical infarct, hemorrhage or mass lesion.  There is mild  patchy hypoattenuation in the cerebral white matter which is nonspecific but most commonly associated with chronic small vessel ischemic changes.  The ventricles are not significantly enlarged.  Mild vascular calcifications.    Visualized paranasal sinuses and mastoid air cells are clear.  Impression: No acute intracranial findings.    Electronically signed by: Jean Pierre Red  Date:    09/24/2024  Time:    10:52        ASSESSMENT & PLAN:   Assessment:  Bilateral L5 transverse process fracture  Bilateral comminuted fracture of the sacrum   Anterolisthesis of S1 on S2  Right inferior pubic symphysis fracture  Elevated alkaline phosphatase   Hyponatremia    Plan:  - Ortho consulted and recommended NPO at midnight for operation tomorrow or Thursday  - Norco as needed for pain   - Fall precaution  - Free T4 1.15, free T3 2.5  - UDS positive for opioids  - Resume appropriate home medications when deemed necessary   - Labs in AM      VTE Prophylaxis: will be placed on Lovneox for DVT prophylaxis and will be advised to be as mobile as possible and sit in a chair as tolerated      __________________________________________________________________________  INPATIENT LIST OF MEDICATIONS     Current Facility-Administered Medications:     acetaminophen tablet 650 mg, 650 mg, Oral, Q8H PRN, Lin Webber, PA-C    acetaminophen tablet 650 mg, 650 mg, Oral, Q4H PRN, Lin Webber, PA-DUSTIN    dextrose 10% bolus 125 mL 125 mL, 12.5 g, Intravenous, PRNAkbar Kallie E., PA-C    dextrose 10% bolus 250 mL 250 mL, 25 g, Intravenous, PRN, Lin Webber, PA-C    enoxaparin injection 40 mg, 40 mg, Subcutaneous, Daily, Lin Webber, PA-DUSTIN    glucagon (human recombinant) injection 1 mg, 1 mg, Intramuscular, PRN, Lin Webber, PA-C    glucose chewable tablet 16 g, 16 g, Oral, PRNAkbar Kallie E., PA-C    glucose chewable tablet 24 g, 24 g, Oral, PRN, Lin Webber, PA-C    ondansetron injection 4 mg, 4 mg, Intravenous,  Q4H PRN, Lin Webber PA-C    sodium chloride 0.9% flush 10 mL, 10 mL, Intravenous, Q12H PRN, Lin Webber PA-C  No current outpatient medications on file.      Scheduled Meds:   enoxparin  40 mg Subcutaneous Daily     Continuous Infusions:  PRN Meds:.  Current Facility-Administered Medications:     acetaminophen, 650 mg, Oral, Q8H PRN    acetaminophen, 650 mg, Oral, Q4H PRN    dextrose 10%, 12.5 g, Intravenous, PRN    dextrose 10%, 25 g, Intravenous, PRN    glucagon (human recombinant), 1 mg, Intramuscular, PRN    glucose, 16 g, Oral, PRN    glucose, 24 g, Oral, PRN    ondansetron, 4 mg, Intravenous, Q4H PRN    sodium chloride 0.9%, 10 mL, Intravenous, Q12H PRN      Discharge Planning and Disposition: Anticipated discharge to be determined.    ILin PA, have reviewed and discussed the case with Dr. China Reid, DO    Please see the following addendum for further assessment and plan from there attending MD.      Portion of this note is dictated using EMR integrated voice recognition software and may be subjected to voice recognition errors not corrected with proofreading. Please  for clarification if needed.       Lin Webber PA-C  09/24/2024

## 2024-09-24 NOTE — PROGRESS NOTES
Pt has bilateral sacral fractures and U type component. We will plan to operate tomorrow vs Thursday.      NPO MN    This note/OR report was created with the assistance of  voice recognition software or phone  dictation.  There may be transcription errors as a result of using this technology however minimal. Effort has been made to assure accuracy of transcription but any obvious errors or omissions should be clarified with the author of the document.       Feng Velasquez, DO  Orthopedic Trauma Surgery

## 2024-09-25 PROBLEM — E44.0 MODERATE MALNUTRITION: Status: ACTIVE | Noted: 2024-09-25

## 2024-09-25 PROBLEM — S32.131A CLOSED MINIMALLY DISPLACED ZONE III FRACTURE OF SACRUM: Status: ACTIVE | Noted: 2024-09-25

## 2024-09-25 LAB
ALBUMIN SERPL-MCNC: 3.2 G/DL (ref 3.4–4.8)
ALBUMIN/GLOB SERPL: 1.3 RATIO (ref 1.1–2)
ALP SERPL-CCNC: 172 UNIT/L (ref 40–150)
ALT SERPL-CCNC: 20 UNIT/L (ref 0–55)
ANION GAP SERPL CALC-SCNC: 7 MEQ/L
AST SERPL-CCNC: 22 UNIT/L (ref 5–34)
BASOPHILS # BLD AUTO: 0.06 X10(3)/MCL
BASOPHILS NFR BLD AUTO: 0.7 %
BILIRUB SERPL-MCNC: 0.3 MG/DL
BUN SERPL-MCNC: 13.4 MG/DL (ref 9.8–20.1)
CALCIUM SERPL-MCNC: 8.7 MG/DL (ref 8.4–10.2)
CHLORIDE SERPL-SCNC: 99 MMOL/L (ref 98–107)
CO2 SERPL-SCNC: 25 MMOL/L (ref 23–31)
CREAT SERPL-MCNC: 0.65 MG/DL (ref 0.55–1.02)
CREAT/UREA NIT SERPL: 21
EOSINOPHIL # BLD AUTO: 0.09 X10(3)/MCL (ref 0–0.9)
EOSINOPHIL NFR BLD AUTO: 1 %
ERYTHROCYTE [DISTWIDTH] IN BLOOD BY AUTOMATED COUNT: 12.2 % (ref 11.5–17)
GFR SERPLBLD CREATININE-BSD FMLA CKD-EPI: >60 ML/MIN/1.73/M2
GLOBULIN SER-MCNC: 2.4 GM/DL (ref 2.4–3.5)
GLUCOSE SERPL-MCNC: 89 MG/DL (ref 82–115)
HCT VFR BLD AUTO: 34.8 % (ref 37–47)
HGB BLD-MCNC: 11.5 G/DL (ref 12–16)
IMM GRANULOCYTES # BLD AUTO: 0.04 X10(3)/MCL (ref 0–0.04)
IMM GRANULOCYTES NFR BLD AUTO: 0.4 %
LYMPHOCYTES # BLD AUTO: 1.88 X10(3)/MCL (ref 0.6–4.6)
LYMPHOCYTES NFR BLD AUTO: 20.8 %
MCH RBC QN AUTO: 30 PG (ref 27–31)
MCHC RBC AUTO-ENTMCNC: 33 G/DL (ref 33–36)
MCV RBC AUTO: 90.9 FL (ref 80–94)
MONOCYTES # BLD AUTO: 0.92 X10(3)/MCL (ref 0.1–1.3)
MONOCYTES NFR BLD AUTO: 10.2 %
NEUTROPHILS # BLD AUTO: 6.06 X10(3)/MCL (ref 2.1–9.2)
NEUTROPHILS NFR BLD AUTO: 66.9 %
NRBC BLD AUTO-RTO: 0 %
PLATELET # BLD AUTO: 458 X10(3)/MCL (ref 130–400)
PMV BLD AUTO: 8.4 FL (ref 7.4–10.4)
POTASSIUM SERPL-SCNC: 4 MMOL/L (ref 3.5–5.1)
PROT SERPL-MCNC: 5.6 GM/DL (ref 5.8–7.6)
RBC # BLD AUTO: 3.83 X10(6)/MCL (ref 4.2–5.4)
SODIUM SERPL-SCNC: 131 MMOL/L (ref 136–145)
WBC # BLD AUTO: 9.05 X10(3)/MCL (ref 4.5–11.5)

## 2024-09-25 PROCEDURE — 25000003 PHARM REV CODE 250: Performed by: NURSE PRACTITIONER

## 2024-09-25 PROCEDURE — 25000003 PHARM REV CODE 250

## 2024-09-25 PROCEDURE — 80053 COMPREHEN METABOLIC PANEL: CPT | Performed by: PHYSICIAN ASSISTANT

## 2024-09-25 PROCEDURE — 36415 COLL VENOUS BLD VENIPUNCTURE: CPT | Performed by: PHYSICIAN ASSISTANT

## 2024-09-25 PROCEDURE — 99223 1ST HOSP IP/OBS HIGH 75: CPT | Mod: ,,, | Performed by: ORTHOPAEDIC SURGERY

## 2024-09-25 PROCEDURE — 25000003 PHARM REV CODE 250: Performed by: PHYSICIAN ASSISTANT

## 2024-09-25 PROCEDURE — 63600175 PHARM REV CODE 636 W HCPCS: Performed by: PHYSICIAN ASSISTANT

## 2024-09-25 PROCEDURE — 85025 COMPLETE CBC W/AUTO DIFF WBC: CPT | Performed by: PHYSICIAN ASSISTANT

## 2024-09-25 PROCEDURE — 11000001 HC ACUTE MED/SURG PRIVATE ROOM

## 2024-09-25 PROCEDURE — 25000003 PHARM REV CODE 250: Performed by: STUDENT IN AN ORGANIZED HEALTH CARE EDUCATION/TRAINING PROGRAM

## 2024-09-25 PROCEDURE — 63600175 PHARM REV CODE 636 W HCPCS: Performed by: STUDENT IN AN ORGANIZED HEALTH CARE EDUCATION/TRAINING PROGRAM

## 2024-09-25 PROCEDURE — 99499 UNLISTED E&M SERVICE: CPT | Mod: ,,, | Performed by: ORTHOPAEDIC SURGERY

## 2024-09-25 RX ORDER — ONDANSETRON 4 MG/1
4 TABLET, ORALLY DISINTEGRATING ORAL EVERY 6 HOURS PRN
Status: CANCELLED | OUTPATIENT
Start: 2024-09-25

## 2024-09-25 RX ORDER — METHOCARBAMOL 750 MG/1
750 TABLET, FILM COATED ORAL 3 TIMES DAILY
Status: DISCONTINUED | OUTPATIENT
Start: 2024-09-25 | End: 2024-09-26

## 2024-09-25 RX ORDER — SODIUM CHLORIDE, SODIUM LACTATE, POTASSIUM CHLORIDE, CALCIUM CHLORIDE 600; 310; 30; 20 MG/100ML; MG/100ML; MG/100ML; MG/100ML
INJECTION, SOLUTION INTRAVENOUS CONTINUOUS
Status: CANCELLED | OUTPATIENT
Start: 2024-09-25

## 2024-09-25 RX ORDER — MIDAZOLAM HYDROCHLORIDE 2 MG/2ML
2 INJECTION, SOLUTION INTRAMUSCULAR; INTRAVENOUS ONCE AS NEEDED
Status: CANCELLED | OUTPATIENT
Start: 2024-09-25 | End: 2036-02-22

## 2024-09-25 RX ORDER — LIOTHYRONINE SODIUM 5 UG/1
5 TABLET ORAL DAILY
Status: DISCONTINUED | OUTPATIENT
Start: 2024-09-25 | End: 2024-09-26

## 2024-09-25 RX ORDER — LEVOTHYROXINE SODIUM 50 UG/1
50 TABLET ORAL
Status: DISCONTINUED | OUTPATIENT
Start: 2024-09-25 | End: 2024-09-25

## 2024-09-25 RX ORDER — ACETAMINOPHEN 10 MG/ML
15 INJECTION, SOLUTION INTRAVENOUS ONCE
Status: CANCELLED | OUTPATIENT
Start: 2024-09-25 | End: 2024-09-25

## 2024-09-25 RX ORDER — MEPERIDINE HYDROCHLORIDE 25 MG/ML
12.5 INJECTION INTRAMUSCULAR; INTRAVENOUS; SUBCUTANEOUS ONCE AS NEEDED
Status: CANCELLED | OUTPATIENT
Start: 2024-09-25 | End: 2024-09-26

## 2024-09-25 RX ORDER — GABAPENTIN 300 MG/1
300 CAPSULE ORAL 2 TIMES DAILY
Status: DISCONTINUED | OUTPATIENT
Start: 2024-09-25 | End: 2024-09-30 | Stop reason: HOSPADM

## 2024-09-25 RX ORDER — LEVOTHYROXINE SODIUM 88 UG/1
88 TABLET ORAL
Status: DISCONTINUED | OUTPATIENT
Start: 2024-09-25 | End: 2024-09-25

## 2024-09-25 RX ORDER — DIPHENHYDRAMINE HYDROCHLORIDE 50 MG/ML
25 INJECTION INTRAMUSCULAR; INTRAVENOUS EVERY 6 HOURS PRN
Status: DISCONTINUED | OUTPATIENT
Start: 2024-09-25 | End: 2024-09-30 | Stop reason: HOSPADM

## 2024-09-25 RX ORDER — MORPHINE SULFATE 4 MG/ML
3 INJECTION, SOLUTION INTRAMUSCULAR; INTRAVENOUS EVERY 6 HOURS PRN
Status: DISCONTINUED | OUTPATIENT
Start: 2024-09-25 | End: 2024-09-27

## 2024-09-25 RX ORDER — LIDOCAINE HYDROCHLORIDE 10 MG/ML
1 INJECTION, SOLUTION EPIDURAL; INFILTRATION; INTRACAUDAL; PERINEURAL ONCE
Status: CANCELLED | OUTPATIENT
Start: 2024-09-25 | End: 2024-09-25

## 2024-09-25 RX ORDER — LEVOTHYROXINE SODIUM 88 UG/1
88 TABLET ORAL
Status: DISCONTINUED | OUTPATIENT
Start: 2024-09-26 | End: 2024-09-30 | Stop reason: HOSPADM

## 2024-09-25 RX ORDER — SODIUM CHLORIDE 9 MG/ML
INJECTION, SOLUTION INTRAVENOUS CONTINUOUS
Status: DISCONTINUED | OUTPATIENT
Start: 2024-09-25 | End: 2024-09-27

## 2024-09-25 RX ORDER — LEVOTHYROXINE SODIUM 88 UG/1
88 TABLET ORAL
Status: DISCONTINUED | OUTPATIENT
Start: 2024-09-26 | End: 2024-09-25

## 2024-09-25 RX ORDER — CEFAZOLIN SODIUM 2 G/50ML
2 SOLUTION INTRAVENOUS ONCE
Status: DISCONTINUED | OUTPATIENT
Start: 2024-09-27 | End: 2024-09-26

## 2024-09-25 RX ORDER — SODIUM CITRATE AND CITRIC ACID MONOHYDRATE 334; 500 MG/5ML; MG/5ML
30 SOLUTION ORAL
Status: CANCELLED | OUTPATIENT
Start: 2024-09-25

## 2024-09-25 RX ADMIN — MORPHINE SULFATE 3 MG: 4 INJECTION, SOLUTION INTRAMUSCULAR; INTRAVENOUS at 10:09

## 2024-09-25 RX ADMIN — SODIUM CHLORIDE: 9 INJECTION, SOLUTION INTRAVENOUS at 02:09

## 2024-09-25 RX ADMIN — ENOXAPARIN SODIUM 40 MG: 40 INJECTION SUBCUTANEOUS at 04:09

## 2024-09-25 RX ADMIN — BUSPIRONE HYDROCHLORIDE 5 MG: 5 TABLET ORAL at 10:09

## 2024-09-25 RX ADMIN — HYDROCODONE BITARTRATE AND ACETAMINOPHEN 1 TABLET: 7.5; 325 TABLET ORAL at 10:09

## 2024-09-25 RX ADMIN — ACETAMINOPHEN 650 MG: 325 TABLET, FILM COATED ORAL at 01:09

## 2024-09-25 RX ADMIN — HYDROCODONE BITARTRATE AND ACETAMINOPHEN 1 TABLET: 7.5; 325 TABLET ORAL at 03:09

## 2024-09-25 RX ADMIN — LIOTHYRONINE SODIUM 5 MCG: 5 TABLET ORAL at 02:09

## 2024-09-25 RX ADMIN — METHOCARBAMOL 750 MG: 750 TABLET ORAL at 04:09

## 2024-09-25 RX ADMIN — METHOCARBAMOL 750 MG: 750 TABLET ORAL at 10:09

## 2024-09-25 RX ADMIN — GABAPENTIN 300 MG: 300 CAPSULE ORAL at 02:09

## 2024-09-25 NOTE — PT/OT/SLP PROGRESS
Physical Therapy Treatment    Patient Name:  Su Hall   MRN:  [id number]    Patient has bilateral sacral fractures. Per ortho, plan to operate today vs tomorrow. PT to f/u after surgery.

## 2024-09-25 NOTE — NURSING
-- DO NOT REPLY / DO NOT REPLY ALL --  -- Message is from [facility] [employer] ([facility]) --    General Patient Message :Patient is asking for a call back, she has been trying to schedule an appointment with Dr. Cool and has been trying to schedule for a while now. Please reach out to the patient, patient states she was told her panel is closed and she is an existing patient and the panel should not be closed to her seeing her pcp. Pt is overdue for 2 screenings and she would like to speak with some one as soon as possible    Caller Information       Type Contact Phone/Fax    10/14/2022 08:11 AM CDT Phone (Incoming) Rola Velásquez M (Self) [phone] (M)        Alternative phone number: no    Can a detailed message be left? Yes    Message Turnaround:     Is it Working Hours? Yes - Working Hours     [place]:    Please give this turnaround time to the caller:   \"This message will be sent to [state Provider's name]. The clinical team will fulfill your request as soon as they review your message.\"                 Nurses Note -- 4 Eyes      9/24/2024   9:45 PM      Skin assessed during: Q Shift Change      [x] No Altered Skin Integrity Present    [x]Prevention Measures Documented      [] Yes- Altered Skin Integrity Present or Discovered   [] LDA Added if Not in Epic (Describe Wound)   [] New Altered Skin Integrity was Present on Admit and Documented in LDA   [] Wound Image Taken    Wound Care Consulted? No    Attending Nurse:  Samantha Rodriguez RN/Staff Member:   Alma Delia

## 2024-09-25 NOTE — NURSING
Nurses Note -- 4 Eyes      9/25/2024   11:01 AM      Skin assessed during: Q Shift Change      [x] No Altered Skin Integrity Present    []Prevention Measures Documented      [] Yes- Altered Skin Integrity Present or Discovered   [] LDA Added if Not in Epic (Describe Wound)   [] New Altered Skin Integrity was Present on Admit and Documented in LDA   [] Wound Image Taken    Wound Care Consulted? No    Attending Nurse:  Jaquelin Rodriguez RN/Staff Member:   yes

## 2024-09-25 NOTE — PROGRESS NOTES
No Orthopedic surgery planned with our service. We will await Nsx recommendations going forward. Dr Acosta and I have discussed the case.    This note/OR report was created with the assistance of  voice recognition software or phone  dictation.  There may be transcription errors as a result of using this technology however minimal. Effort has been made to assure accuracy of transcription but any obvious errors or omissions should be clarified with the author of the document.       Feng Velasquez, DO  Orthopedic Trauma Surgery

## 2024-09-25 NOTE — CONSULTS
The patient has been visited twice by me.   I got the message last night and I made it a priority to attend to her needs.  I provided a compassionate presence and support.  I gave her the sacraments of reconciliation and anointing of the sick.  I also prayed for her and offered some words of hope and comfort.

## 2024-09-25 NOTE — PROGRESS NOTES
Consult received and chart reviewed, noted ortho recommending nsx consult for possible spinal issues and also recommending further sacral stabilization. OT will defer at this time and continue to f/u as appropriate.
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
## 2024-09-25 NOTE — NURSING
Nurses Note -- 4 Eyes      9/24/2024   8:25 PM      Skin assessed during: Admit      [x] No Altered Skin Integrity Present    []Prevention Measures Documented      [] Yes- Altered Skin Integrity Present or Discovered   [] LDA Added if Not in Epic (Describe Wound)   [] New Altered Skin Integrity was Present on Admit and Documented in LDA   [] Wound Image Taken    Wound Care Consulted? No    Attending Nurse:  Alma Delia Rodriguez RN/Staff Member:  nicolas

## 2024-09-25 NOTE — PROGRESS NOTES
Inpatient Nutrition Assessment    Admit Date: 9/24/2024   Total duration of encounter: 1 day   Patient Age: 68 y.o.    Nutrition Recommendation/Prescription     Diet Heart Healthy ordered, add double portions   Encouraged adequate PO intake  Monitor appetite/PO intake, weight, and labs    Communication of Recommendations: reviewed with nurse, reviewed with patient, and reviewed with family    Nutrition Assessment     Malnutrition Assessment/Nutrition-Focused Physical Exam    Malnutrition Context: acute illness or injury (09/25/24 1456)  Malnutrition Level: moderate (09/25/24 1456)  Energy Intake (Malnutrition): other (see comments) (Does not meet criteria) (09/25/24 1456)  Weight Loss (Malnutrition): other (see comments) (Unable to assess) (09/25/24 1456)  Subcutaneous Fat (Malnutrition): mild depletion (09/25/24 1456)  Orbital Region (Subcutaneous Fat Loss): mild depletion  Upper Arm Region (Subcutaneous Fat Loss): mild depletion     Muscle Mass (Malnutrition): mild depletion (09/25/24 1456)     Clavicle Bone Region (Muscle Loss): mild depletion  Clavicle and Acromion Bone Region (Muscle Loss): mild depletion  Scapular Bone Region (Muscle Loss): mild depletion              Fluid Accumulation (Malnutrition): other (see comments) (Does not meet criteria) (09/25/24 1456)        A minimum of two characteristics is recommended for diagnosis of either severe or non-severe malnutrition.    Chart Review    Reason Seen: continuous nutrition monitoring BMI <18.5 kg/m^2    Malnutrition Screening Tool Results   Have you recently lost weight without trying?: Yes: 2-13 lbs  Have you been eating poorly because of a decreased appetite?: No   MST Score: 1   Diagnosis:  Bilateral L5 transverse process fracture  Bilateral comminuted fracture of the sacrum   Anterolisthesis of S1 on S2  Right inferior pubic symphysis fracture  Elevated alkaline phosphatase   Hyponatremia    Relevant Medical History:    Hypercholesteremia       Hypothyroidism, unspecified      Migraine      Palpitations      Trigger thumb, right thumb        Scheduled Medications:  busPIRone, 5 mg, BID  [START ON 9/27/2024] ceFAZolin (Ancef) IV (PEDS and ADULTS), 2 g, Once  enoxparin, 40 mg, Daily  gabapentin, 300 mg, BID  [START ON 9/26/2024] levothyroxine, 88 mcg, Before breakfast  liothyronine, 5 mcg, Daily  methocarbamoL, 750 mg, TID    Continuous Infusions:  0.9% NaCl, Last Rate: 100 mL/hr at 09/25/24 1421    PRN Medications:  acetaminophen, 650 mg, Q8H PRN  acetaminophen, 650 mg, Q4H PRN  dextrose 10%, 12.5 g, PRN  dextrose 10%, 25 g, PRN  diphenhydrAMINE, 25 mg, Q6H PRN  glucagon (human recombinant), 1 mg, PRN  glucose, 16 g, PRN  glucose, 24 g, PRN  HYDROcodone-acetaminophen, 1 tablet, Q4H PRN  lorazepam, 2 mg, Once PRN  melatonin, 6 mg, Nightly PRN  morphine, 3 mg, Q6H PRN  ondansetron, 4 mg, Q4H PRN  sodium chloride 0.9%, 10 mL, Q12H PRN    Calorie Containing IV Medications: no significant kcals from medications at this time    Recent Labs   Lab 09/24/24  1055 09/25/24  0337 09/25/24  0408   * 131*  --    K 3.8 4.0  --    CALCIUM 9.2 8.7  --    MG 2.20  --   --    CL 98 99  --    CO2 24 25  --    BUN 14.6 13.4  --    CREATININE 0.67 0.65  --    EGFRNORACEVR >60 >60  --    GLUCOSE 97 89  --    BILITOT 0.4 0.3  --    ALKPHOS 190* 172*  --    ALT 22 20  --    AST 27 22  --    ALBUMIN 3.5 3.2*  --    WBC 9.28  --  9.05   HGB 12.9  --  11.5*   HCT 39.2  --  34.8*     Nutrition Orders:  Diet NPO Except for: Sips with Medication, Medication  Diet Heart Healthy      Appetite/Oral Intake: not applicable/not applicable  Factors Affecting Nutritional Intake: none identified  Social Needs Impacting Access to Food: none identified  Food/Anabaptism/Cultural Preferences:  no milk products  Food Allergies: none reported  Last Bowel Movement: 09/24/24  Wound(s):  none noted    Comments    9/25/2024: Pt reports a good appetite/PO intake prior to admit. Pt NPO at time of  "visit. Pt denies N/V/D/C and chewing/swallowing difficulties. Pt reports wt loss. No pert wt hx per EMR. Pt declined ONS, agreeable to double portions. Last BM noted. Encouraged adequate PO intake. Will monitor.    Anthropometrics    Height: 5' 3" (160 cm), Height Method: Stated  Last Weight: 46.4 kg (102 lb 4.8 oz) (09/24/24 2030), Weight Method: Bed Scale  BMI (Calculated): 18.1  BMI Classification: underweight (BMI less than 18.5)     Ideal Body Weight (IBW), Female: 115 lb     % Ideal Body Weight, Female (lb): 88.96 %                             Usual Weight Provided By: unable to obtain usual weight    Wt Readings from Last 5 Encounters:   09/24/24 46.4 kg (102 lb 4.8 oz)   02/08/21 51.6 kg (113 lb 12.1 oz)     Weight Change(s) Since Admission:   9/24/2024: 46.4 kg  Wt Readings from Last 1 Encounters:   09/24/24 2030 46.4 kg (102 lb 4.8 oz)   09/24/24 0951 49.9 kg (110 lb)   Admit Weight: 49.9 kg (110 lb) (09/24/24 0951), Weight Method: Stated    Estimated Needs    Weight Used For Calorie Calculations: 46.4 kg (102 lb 4.7 oz)  Energy Calorie Requirements (kcal): 6858-0195 (30-35 kcal/kg)     Weight Used For Protein Calculations: 46.4 kg (102 lb 4.7 oz)  Protein Requirements: 55-69 (1.2-1.5 g/kg)  Fluid Requirements (mL): 1392 (1 mL/kcal)  CHO Requirement: 156-191 g (45-55% est min kcal needs)     Enteral Nutrition     Patient not receiving enteral nutrition at this time.    Parenteral Nutrition     Patient not receiving parenteral nutrition support at this time.    Evaluation of Received Nutrient Intake    Calories: not meeting estimated needs  Protein: not meeting estimated needs    Patient Education     Not applicable.    Nutrition Diagnosis     PES: Increased nutrient needs (kcal and protein) related to acute illness as evidenced by increased nutrient demand. (new)     PES: Moderate acute disease or injury related malnutrition related to acute illness as evidenced by mild fat depletion and mild muscle " depletion. (new)    Nutrition Interventions     Intervention(s): general/healthful diet and modified composition of meals/snacks    Goal: Meet greater than 80% of nutritional needs by follow-up. (new)    Nutrition Goals & Monitoring     Dietitian will monitor: food and beverage intake, weight, electrolyte/renal panel, glucose/endocrine profile, and gastrointestinal profile  Discharge planning: too early to determine; pending clinical course  Nutrition Risk/Follow-Up: moderate (follow-up in 3-5 days)   Please consult if re-assessment needed sooner.

## 2024-09-25 NOTE — PROGRESS NOTES
[facility]  Hospital Medicine Progress Note      Chief Complaint: worsening hip pain        HPI: (personally reviewed by me and is documented from initial H&P)   68 y.o. White female with a past medical history of hypertension, hyperlipidemia, anxiety/depression, hypothyroidism. The patient presented to [facility] on 9/24/2024 with a primary complaint of bilateral hip pain for the last month with intermittent numbness down the legs bilaterally.     Patient has been doing physical therapy for the last 3 months due to a pulled Plavix floor muscle.  She has been using a walker to ambulate but now is having difficulty ambulating with the walker.     Upon presentation to the ED, temperature 98.3° F, heart rate 87, blood pressure 147/83, respiratory rate 20 and SpO2 99% on room air.  Labs with sodium 132, alkaline phosphatase 190, , troponin less than 0.01, TSH 5.2, alcohol level less than 10.  CT of the head with no acute intracranial findings.  CT of the lumbar spine with bilateral L5 transverse process fractures which are displaced and bilateral complex comminuted fractures of the sacrum.  CT of the pelvis revealed comminuted fracture of the sacrum with anterolisthesis of S1 on S2 measuring 9.4 mm, subtitle fracture of the pubic symphysis of the right side inferiorly and transverse process fracture at L5 on the right and left side.  In the ED patient received methocarbamol, morphine and Zofran.  She is admitted to hospital medicine services for further medical management.        Interval History:        Patient is adamant about receiving her thyroid medication; I, as well as the nurse, have explained to her, more times than we can count, that she is NPO and cannot have anything by mouth in preparation for upcoming surgery, unless there is no plan for surgery (nurse will get an update).    Patient is restless, a sitter is present at bedside because overnight patient was restless and  difficult to redirect.    No plans from neuro at this time.   is present at bedside.       Objective Assessment:  Physical Exam      Vital signs have been personally reviewed by me   General: Appears comfortable, no acute distress.  Neuro/pysch: very anxious, difficult to redirect.     Integumentary: Warm, dry, intact.  Musculoskeletal: Purposeful movement noted.     Respiratory: No accessory muscle use. Breath sounds are equal.  Cardiovascular: Regular rate.       VITAL SIGNS: 24 HRS MIN & MAX LAST   Temp  Min: 97.4 °F (36.3 °C)  Max: 98.3 °F (36.8 °C) 97.4 °F (36.3 °C)   BP  Min: 101/83  Max: 152/87 135/70   Pulse  Min: 68  Max: 94  75   Resp  Min: 10  Max: 20 18   SpO2  Min: 96 %  Max: 100 % 96 %     CT Pelvis Without Contrast  Narrative: EXAMINATION:  CT PELVIS WITHOUT CONTRAST    CLINICAL HISTORY:  Pelvis fxs; Unspecified fracture of sacrum, initial encounter for closed fracture    TECHNIQUE:  Low dose axial images, sagittal and coronal reformations were obtained from iliac crest to the pubic symphysis.  No contrast was administered.  Automatic exposure control is utilized to reduce patient radiation exposure.    COMPARISON:  None    FINDINGS:  The iliac bone is intact bilaterally.    There is a subtle area of irregularity along the pubis symphysis on the right side inferiorly possibly representing a subtle fracture.  The ischium is otherwise intact on the right and left side.    Acetabulum is intact bilaterally.    The right and left proximal femurs are intact.  No fracture is seen.  No dislocation is seen.    There is a comminuted fracture of the sacral ala on the right and left side.  Fracture extends into midbody of S1.  There is anterolisthesis of S1 on S2 that measures 9.4 mm.    There is a fracture of the transverse process of L5 on the right and left side.    The bones are diffusely osteoporotic.    Intrapelvic structures show no evidence of hematoma or fluid collection.  No free air is seen.   Visualized portion of the bowel appear unremarkable.  The urinary bladder is distended.  Impression: Comminuted fracture of the sacrum as outlined above with anterolisthesis of S1 on S2 measuring 9.4 mm    Subtle fracture seen along the pubic symphysis on the right side inferiorly    Transverse process fracture at L5 on the right and left side    Distended urinary bladder    Electronically signed by: Eugenio Nolen  Date:    09/24/2024  Time:    14:07  CT Lumbar Spine Without Contrast  Narrative: EXAMINATION:  CT LUMBAR SPINE WITHOUT CONTRAST    CLINICAL HISTORY:  Low back pain, symptoms persist with > 6wks conservative treatment;    TECHNIQUE:  Multidetector axial images were performed of the lumbar spine without contrast and the images were reformatted.    Dose length product of 539 mGycm. Automated exposure control was utilized to minimize radiation dose.    COMPARISON:  None available    FINDINGS:  There are bilateral displaced fractures of the transverse processes of L5.  There are bilateral complex comminuted fractures of the sacrumseen from axial image 102 to image 118 series 8.  Fracture line of the right sacrum disrupts the anterior margin of the right sacral foramen on image 117 series 8.  Similarly, fracture line of left sacrum focally disrupts the anterior aspect of sacral foramen.  There is no significant distortion of bilateral sacral foramen configuration.  Fracture lines also extend to the sacroiliac joints without significant diastasis or dislocation.  There is anterolisthesis of S1 on S2.  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  Demineralization of the bones.    Disc segmental analysis is given below:    At L1-L2, disc is unremarkable.  Central canal is not stenosed and there are no narrowings of the neural foramen.    At L2-L3, there is bulging of annulus fibrosis which indents the ventral thecal sac.  Bilateral facet arthropathy.  Central canal stenosis is  minimal.  There are no narrowings are neural foramen.    At L3-L4, there is generalized disc bulge, ligamentum flavum thickening and facet arthropathy resulting in marked central canal stenosis.  There are no significant narrowings of the neural foramen.    At L4-L5, there is broad disc protrusion, ligamentum flavum thickening and facet arthropathy resulting in marked central canal stenosis.  Right neural foramen is patent.  There is moderate narrowing of the left neural foramen.    At L5-S1, there is left paracentral disc extrusion which mildly narrows the left neural foramen.  Right neural foramen is patent.  No significant central canal stenosis.    Right kidney medial cortical hypodensities are probable cysts.  Impression: 1. Bilateral L5 transverse processes displaced fractures.    2. Bilateral complex comminuted fractures of the sacrum.    Electronically signed by: Harvinder Dutton  Date:    09/24/2024  Time:    11:13  CT Head Without Contrast  Narrative: EXAMINATION:  CT HEAD WITHOUT CONTRAST    CLINICAL HISTORY:  Mental status change, unknown cause;    TECHNIQUE:  CT imaging of the head performed from the skull base to the vertex without intravenous contrast.   mGycm. Automatic exposure control, adjustment of mA/kV or iterative reconstruction technique was used to reduce radiation.    COMPARISON:  None Available.    FINDINGS:  There is no acute cortical infarct, hemorrhage or mass lesion.  There is mild patchy hypoattenuation in the cerebral white matter which is nonspecific but most commonly associated with chronic small vessel ischemic changes.  The ventricles are not significantly enlarged.  Mild vascular calcifications.    Visualized paranasal sinuses and mastoid air cells are clear.  Impression: No acute intracranial findings.    Electronically signed by: Jean Pierre Red  Date:    09/24/2024  Time:    10:52    Recent Labs   Lab 09/24/24  1055 09/25/24  0408   WBC 9.28 9.05   RBC 4.31 3.83*   HGB 12.9  11.5*   HCT 39.2 34.8*   MCV 91.0 90.9   MCH 29.9 30.0   MCHC 32.9* 33.0   RDW 12.0 12.2   * 458*   MPV 8.1 8.4       Recent Labs   Lab 09/24/24  1055 09/25/24  0337   * 131*   K 3.8 4.0   CL 98 99   CO2 24 25   BUN 14.6 13.4   CREATININE 0.67 0.65   CALCIUM 9.2 8.7   MG 2.20  --    ALBUMIN 3.5 3.2*   ALKPHOS 190* 172*   ALT 22 20   AST 27 22   BILITOT 0.4 0.3     Microbiology Results (last 7 days)       ** No results found for the last 168 hours. **               Medications for Hospital Course         Scheduled Med:   busPIRone  5 mg Oral BID    enoxparin  40 mg Subcutaneous Daily    gabapentin  300 mg Oral BID    [START ON 9/26/2024] levothyroxine  88 mcg Oral Before breakfast      Continuous Infusions:       PRN Meds:    Current Facility-Administered Medications:     acetaminophen, 650 mg, Oral, Q8H PRN    acetaminophen, 650 mg, Oral, Q4H PRN    dextrose 10%, 12.5 g, Intravenous, PRN    dextrose 10%, 25 g, Intravenous, PRN    glucagon (human recombinant), 1 mg, Intramuscular, PRN    glucose, 16 g, Oral, PRN    glucose, 24 g, Oral, PRN    HYDROcodone-acetaminophen, 1 tablet, Oral, Q4H PRN    melatonin, 6 mg, Oral, Nightly PRN    morphine, 3 mg, Intravenous, Q6H PRN    ondansetron, 4 mg, Intravenous, Q4H PRN    sodium chloride 0.9%, 10 mL, Intravenous, Q12H PRN     Assessment and Plan        Bilateral L5 transverse process fracture  Bilateral comminuted fracture of the sacrum   Anterolisthesis of S1 on S2  Right inferior pubic symphysis fracture  Elevated alkaline phosphatase   Hyponatremia     Above present on admission     __________________________________________________________________________________________________________________________    Will add gentle IV hydration, recheck labs in the a.m.  Followup with orthopedic/neuro plans.    Update: no plan for ortho surgery, neurosurgeon will plan surgical intervention at somepoint this week. Ortho signed off. Oral thyroid has been given.   NPO  status removed for now; NPO midnight day prior to surgery   Continue supportive care  Continue checking vital signs q4hrs.  Reviewed and restarted appropriate home medications.     Will consult CM post procedure.   DVT prophylaxis initiated   Nurse notified to page me if any changes occur     Consults: orthopedic surgeon, neurosurgeon    I have personally reviewed the specialist documentation and/or have spoken to the specialist with regard to the care of this patient; recommendations are noted above.     _______________________________________________________________________________________________________________________________      I have spent 40 minutes on the day of the visit; time spent includes face to face time and non-face to face time preparing to see the patient (eg, review of tests), independently reviewing and interpreting medical records, both past and current; documenting clinical information in the electronic or other health record, and communicating results to the patient/family/caregiver and care coordinator and nursing team.      All diagnosis and differential diagnosis have been reviewed,  interpreted and communicated appropriately to care team. assessment and plan has been documented; I have personally reviewed the labs and test results that are presently available and pertinent to this hospital course; I have reviewed medical records based upon their availability.    All of the patient's questions have been  addressed and answered. Patient's is agreeable to the above stated plan.   I will continue to monitor closely and make adjustments to medical management as needed.    China Reid,    09/25/2024     This note was created with the assistance of Dragon voice recognition software. There may be transcription errors as a result of using this technology however minimal. Effort has been made to assure accuracy of transcription but any obvious errors or omissions should be clarified with  the author of the document.

## 2024-09-25 NOTE — PLAN OF CARE
09/25/24 1519   Discharge Assessment   Assessment Type Discharge Planning Assessment   Confirmed/corrected address, phone number and insurance Yes   Confirmed Demographics Correct on Facesheet   Source of Information patient   Communicated HASEEB with patient/caregiver Date not available/Unable to determine   Reason For Admission sacral fx   People in Home spouse   Do you expect to return to your current living situation? Yes   Do you have help at home or someone to help you manage your care at home? Yes   Who are your caregiver(s) and their phone number(s)?  Jean Pierre Isabel [phone]   Prior to hospitilization cognitive status: Alert/Oriented   Current cognitive status: Alert/Oriented   Walking or Climbing Stairs Difficulty yes   Walking or Climbing Stairs ambulation difficulty, requires equipment   Mobility Management walker   Dressing/Bathing Difficulty no   Equipment Currently Used at Home walker, rolling   Patient currently being followed by outpatient case management? No   Do you currently have service(s) that help you manage your care at home? No   Do you take prescription medications? Yes   Do you have prescription coverage? Yes   Coverage [employer]   Do you have any problems affording any of your prescribed medications? No   Is the patient taking medications as prescribed? yes   Who is going to help you get home at discharge? elijah Greenfield   Are you on dialysis? No   Do you take coumadin? No   Discharge Plan A Home   Discharge Plan B Home Health   DME Needed Upon Discharge  none   Discharge Plan discussed with: Patient;Spouse/sig other   Name(s) and Number(s) elijah Greenfield   Transition of Care Barriers None   OTHER   Name(s) of People in Home elijah Greenfield

## 2024-09-25 NOTE — CONSULTS
[facility] - Oncology Acute  Orthopedic Trauma  Consult Note    Patient Name: Su Hall  MRN: [id number]  Admission Date: 9/24/2024  Hospital Length of Stay: 1 days  Attending Provider: China Reid DO  Primary Care Provider: Alessia Boston MD        Inpatient consult to Orthopedic Surgery  Consult performed by: Feng Velasquez DO  Consult ordered by: Katty Chávez MD        Subjective:         Chief Complaint:   Chief Complaint   Patient presents with    Hip Pain     Pt co back pain radiating to hips and knees. Pt standing, but states she can't walk for past month.    Altered Mental Status     Pt confused and slow to respond to answer questions.  states pt has not been sleeping. CBG 98mg/dl.         HPI:  Patient was a 68-year-old female status post fall.  She has been largely nonambulatory since her fall diagnosed with a U type sacral fracture.  Today she is complaining of posterior leg numbness possible bowel or bladder incontinence.  History is not consistent.  She has new numbness and tingling in the posterior thighs.  She also has tingling in her feet as well.    Past Medical History:   Diagnosis Date    Hypercholesteremia     Hypothyroidism, unspecified     Migraine     Palpitations     Trigger thumb, right thumb        Past Surgical History:   Procedure Laterality Date    Caesarean section      COLONOSCOPY      D&C - Dilatation and curettage      History of esophagogastroduodenoscopy (EGD)      Release Right Hand Tendon, Open Approach (01/14/2019)      Release Trigger Finger Or Thumb (Right) (01/14/2019)      Tendon sheath incision (eg, for trigger finger) (01/14/2019)         Review of patient's allergies indicates:   Allergen Reactions    Beta-blockers (beta-adrenergic blocking agts)     Statins-hmg-coa reductase inhibitors        Current Facility-Administered Medications   Medication    acetaminophen tablet 650 mg    acetaminophen tablet 650 mg    busPIRone tablet 5  "mg    dextrose 10% bolus 125 mL 125 mL    dextrose 10% bolus 250 mL 250 mL    enoxaparin injection 40 mg    glucagon (human recombinant) injection 1 mg    glucose chewable tablet 16 g    glucose chewable tablet 24 g    HYDROcodone-acetaminophen 7.5-325 mg per tablet 1 tablet    melatonin tablet 6 mg    ondansetron injection 4 mg    sodium chloride 0.9% flush 10 mL     Family History    None       Tobacco Use    Smoking status: Never    Smokeless tobacco: Never   Substance and Sexual Activity    Alcohol use: Yes     Alcohol/week: 1.0 standard drink of alcohol     Types: 1 Standard drinks or equivalent per week     Comment: Occasionally    Drug use: Never    Sexual activity: Not on file       ROS:  Constitutional: Denies fever chills  Eyes: No change in vision  ENT: No ringing or current infections  CV: No chest pain  Resp: No labored breathing  MSK: Pain evident at site of injury located in HPI,   Integ: No signs of abrasions or lacerations  Neuro: No numbness or tingling  Lymphatic: No swelling outside the area of injury   Objective:     Vital Signs (Most Recent):  Temp: 97.4 °F (36.3 °C) (09/25/24 0715)  Pulse: 75 (09/25/24 0715)  Resp: 20 (09/25/24 0715)  BP: 135/70 (09/25/24 0715)  SpO2: 96 % (09/25/24 0715) Vital Signs (24h Range):  Temp:  [97.4 °F (36.3 °C)-98.3 °F (36.8 °C)] 97.4 °F (36.3 °C)  Pulse:  [68-94] 75  Resp:  [10-20] 20  SpO2:  [96 %-100 %] 96 %  BP: (101-152)/(70-87) 135/70     Weight: 46.4 kg (102 lb 4.8 oz)  Height: 5' 3" (160 cm)  Body mass index is 18.12 kg/m².    No intake or output data in the 24 hours ending 09/25/24 0735    Ortho/SPM Exam  General the patient is alert and oriented x3 no acute distress nontoxic-appearing appropriate affect.    Constitutional: Vital signs are examined and stable.  Resp: No signs of labored breathing               LLE: -Skin:  no lacerations           -MSK: Knee F/E, EHL/FHL, Gastroc/Tib anterior Strength 5/5           -Neuro:  Gross sensation decreased " posterior thigh           -Lymphatic: No signs of lymphadenopathy           -CV: Capillary refill is less than 2 seconds. DP/PT pulses 2/4. Compartments soft and compressible                      RLE: -Skin: Dressing CDI, No signs of new abrasions or lacerations, no scars           -MSK: : Knee F/E, EHL/FHL, Gastroc/Tib anterior Strength 5/5           -Neuro:  Gross sensation decreased posterior thigh           -Lymphatic: No signs of lymphadenopathy           -CV: Capillary refill is less than 2 seconds. DP/PT pulses  2/4. Compartments soft and compressible.     Significant Labs:  I have reviewed all labs in relation to Orthopedics  Recent Lab Results  (Last 5 results in the past 72 hours)        09/25/24  0408   09/25/24  0337   09/24/24  1632   09/24/24  1459   09/24/24  1422        Phencyclidine       Negative         Albumin/Globulin Ratio   1.3             ABO and RH     A NEG           Albumin   3.2             ALP   172             ALT   20             Amphetamines, Urine       Negative         Anion Gap   7.0             Appearance, UA       Clear         AST   22             Bacteria, UA       None Seen         Barbituates, Urine       Negative         Baso # 0.06               Basophil % 0.7               Benzodiazepine, Urine       Negative         Bilirubin (UA)       Negative         BILIRUBIN TOTAL   0.3             BUN   13.4             BUN/CREAT RATIO   21             Calcium   8.7             Cannabinoids, Urine       Negative         Chloride   99             CO2   25             Cocaine, Urine       Negative         Color, UA       Colorless         Creatinine   0.65             eGFR   >60             Eos # 0.09               Eos % 1.0               Fentanyl, Urine       Negative         Globulin, Total   2.4             Glucose   89             Glucose, UA       Normal         Group & Rh         A NEG       Hematocrit 34.8               Hemoglobin 11.5               Immature Grans (Abs) 0.04                Immature Granulocytes 0.4               Indirect Sandra GEL         NEG       Ketones, UA       Trace         Leukocyte Esterase, UA       Negative         Lymph # 1.88               LYMPH % 20.8               MCH 30.0               MCHC 33.0               MCV 90.9               MDMA, Urine       Negative         Mono # 0.92               Mono % 10.2               MPV 8.4               Neut # 6.06               Neut % 66.9               NITRITE UA       Negative         nRBC 0.0               Blood, UA       Negative         Opiates, Urine       Positive         pH, UA       5.5         pH, Urine       5.5         Platelet Count 458               Potassium   4.0             PROTEIN TOTAL   5.6             Protein, UA       Negative         RBC 3.83               RBC, UA       0-5         RDW 12.2               Sodium   131             Specific Gravity,UA       1.008         Specific Gravity, Urine Auto       1.008         Specimen Outdate         09/27/2024 23:59       Squamous Epithelial Cells, UA       Trace         Urobilinogen, UA       Normal         WBC, UA       0-5         WBC 9.05                                      Significant Imaging: I have reviewed all pertinent imaging results/findings.  CT Pelvis Without Contrast    Result Date: 9/24/2024  EXAMINATION: CT PELVIS WITHOUT CONTRAST CLINICAL HISTORY: Pelvis fxs; Unspecified fracture of sacrum, initial encounter for closed fracture TECHNIQUE: Low dose axial images, sagittal and coronal reformations were obtained from iliac crest to the pubic symphysis.  No contrast was administered.  Automatic exposure control is utilized to reduce patient radiation exposure. COMPARISON: None FINDINGS: The iliac bone is intact bilaterally. There is a subtle area of irregularity along the pubis symphysis on the right side inferiorly possibly representing a subtle fracture.  The ischium is otherwise intact on the right and left side. Acetabulum is intact  bilaterally. The right and left proximal femurs are intact.  No fracture is seen.  No dislocation is seen. There is a comminuted fracture of the sacral ala on the right and left side.  Fracture extends into midbody of S1.  There is anterolisthesis of S1 on S2 that measures 9.4 mm. There is a fracture of the transverse process of L5 on the right and left side. The bones are diffusely osteoporotic. Intrapelvic structures show no evidence of hematoma or fluid collection.  No free air is seen.  Visualized portion of the bowel appear unremarkable.  The urinary bladder is distended.     Comminuted fracture of the sacrum as outlined above with anterolisthesis of S1 on S2 measuring 9.4 mm Subtle fracture seen along the pubic symphysis on the right side inferiorly Transverse process fracture at L5 on the right and left side Distended urinary bladder Electronically signed by: Eugenio Nolen Date:    09/24/2024 Time:    14:07    CT Lumbar Spine Without Contrast    Result Date: 9/24/2024  EXAMINATION: CT LUMBAR SPINE WITHOUT CONTRAST CLINICAL HISTORY: Low back pain, symptoms persist with > 6wks conservative treatment; TECHNIQUE: Multidetector axial images were performed of the lumbar spine without contrast and the images were reformatted. Dose length product of 539 mGycm. Automated exposure control was utilized to minimize radiation dose. COMPARISON: None available FINDINGS: There are bilateral displaced fractures of the transverse processes of L5.  There are bilateral complex comminuted fractures of the sacrumseen from axial image 102 to image 118 series 8.  Fracture line of the right sacrum disrupts the anterior margin of the right sacral foramen on image 117 series 8.  Similarly, fracture line of left sacrum focally disrupts the anterior aspect of sacral foramen.  There is no significant distortion of bilateral sacral foramen configuration.  Fracture lines also extend to the sacroiliac joints without significant diastasis or  dislocation.  There is anterolisthesis of S1 on S2.  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  Demineralization of the bones. Disc segmental analysis is given below: At L1-L2, disc is unremarkable.  Central canal is not stenosed and there are no narrowings of the neural foramen. At L2-L3, there is bulging of annulus fibrosis which indents the ventral thecal sac.  Bilateral facet arthropathy.  Central canal stenosis is minimal.  There are no narrowings are neural foramen. At L3-L4, there is generalized disc bulge, ligamentum flavum thickening and facet arthropathy resulting in marked central canal stenosis.  There are no significant narrowings of the neural foramen. At L4-L5, there is broad disc protrusion, ligamentum flavum thickening and facet arthropathy resulting in marked central canal stenosis.  Right neural foramen is patent.  There is moderate narrowing of the left neural foramen. At L5-S1, there is left paracentral disc extrusion which mildly narrows the left neural foramen.  Right neural foramen is patent.  No significant central canal stenosis. Right kidney medial cortical hypodensities are probable cysts.     1. Bilateral L5 transverse processes displaced fractures. 2. Bilateral complex comminuted fractures of the sacrum. Electronically signed by: Harvinder Dutton Date:    09/24/2024 Time:    11:13    CT Head Without Contrast    Result Date: 9/24/2024  EXAMINATION: CT HEAD WITHOUT CONTRAST CLINICAL HISTORY: Mental status change, unknown cause; TECHNIQUE: CT imaging of the head performed from the skull base to the vertex without intravenous contrast.   mGycm. Automatic exposure control, adjustment of mA/kV or iterative reconstruction technique was used to reduce radiation. COMPARISON: None Available. FINDINGS: There is no acute cortical infarct, hemorrhage or mass lesion.  There is mild patchy hypoattenuation in the cerebral white matter which is nonspecific but most  commonly associated with chronic small vessel ischemic changes.  The ventricles are not significantly enlarged.  Mild vascular calcifications. Visualized paranasal sinuses and mastoid air cells are clear.     No acute intracranial findings. Electronically signed by: Jean Pierre Red Date:    09/24/2024 Time:    10:52       Assessment/Plan:     Active Diagnoses:    Diagnosis Date Noted POA    PRINCIPAL PROBLEM:  Closed minimally displaced zone III fracture of sacrum [S32.131A] 09/25/2024 Yes      Problems Resolved During this Admission:       Independent Radiology ordered by other provider:   CT scan of the pelvis shows severe poor bone quality with the U type sacral fracture with depression through the S1 segment    Pt has acute injury with risk of severe bodily function with their injury to the sacrum and lower extremity.         I had a long discussion with the patient and  separately.  Patient's has posterior leg numbness and potential bowel and bladder incontinence with a mild-to-moderate amount of pain.  She has no MRI imaging of the lumbar spine.  Due to the symptoms we are concerned there could be more pathology proximal.  We have ordered MRI of the lumbar spine stat we will ask our neurosurgery colleagues to take a look the patient clear her from a cauda equina acute spinal pathology.  I have answered the patient and the 's questions separately in detail for over 30 minutes.  They unsure if they we would want surgery at this time.  Patient will likely need lumbopelvic fixation to bypass the sacral fracture.  I have discussed this with him in great detail.  Patient remain NPO.    I explained that surgery and the nature of their condition are not without risks. These include, but are not limited to, bleeding, infection, neurovascular compromise, malunion, nonunion, hardware complications, wound complications, scarring, cosmetic defects, need for later and/or repeated surgeries, avascular necrosis,  bone death due to initial trauma, pain, loss of ROM, loss of function, PTOA, deformity, stance/gait and/or functional abnormalities, thromboembolic complications, compartment syndrome, loss of limb, loss of life, anesthetic complications, and other imponderables. I explained that these can occur despite the adequacy of treatments rendered, and that their risks are heightened given the nature of their condition.  I have also discussed the importance not using nicotine products due to the increased risk of infection surgical wound healing complications and nonunion of the fracture.  They verbalized understanding.  No guarantees were made.  They would like to continue with surgery at this time. If appropriate family was involved with surgical discussion.           This note/OR report was created with the assistance of  voice recognition software or phone  dictation.  There may be transcription errors as a result of using this technology however minimal. Effort has been made to assure accuracy of transcription but any obvious errors or omissions should be clarified with the author of the document.       Feng Velasquez, DO   Orthopedic Trauma Surgery  [facility] - [facility]

## 2024-09-26 LAB
ANION GAP SERPL CALC-SCNC: 6 MEQ/L
BASOPHILS # BLD AUTO: 0.04 X10(3)/MCL
BASOPHILS NFR BLD AUTO: 0.5 %
BUN SERPL-MCNC: 10.8 MG/DL (ref 9.8–20.1)
CALCIUM SERPL-MCNC: 8.9 MG/DL (ref 8.4–10.2)
CHLORIDE SERPL-SCNC: 101 MMOL/L (ref 98–107)
CO2 SERPL-SCNC: 28 MMOL/L (ref 23–31)
CREAT SERPL-MCNC: 0.68 MG/DL (ref 0.55–1.02)
CREAT/UREA NIT SERPL: 16
EOSINOPHIL # BLD AUTO: 0.09 X10(3)/MCL (ref 0–0.9)
EOSINOPHIL NFR BLD AUTO: 1.1 %
ERYTHROCYTE [DISTWIDTH] IN BLOOD BY AUTOMATED COUNT: 12.1 % (ref 11.5–17)
GFR SERPLBLD CREATININE-BSD FMLA CKD-EPI: >60 ML/MIN/1.73/M2
GLUCOSE SERPL-MCNC: 88 MG/DL (ref 82–115)
HCT VFR BLD AUTO: 38.2 % (ref 37–47)
HGB BLD-MCNC: 12.2 G/DL (ref 12–16)
IMM GRANULOCYTES # BLD AUTO: 0.03 X10(3)/MCL (ref 0–0.04)
IMM GRANULOCYTES NFR BLD AUTO: 0.4 %
LYMPHOCYTES # BLD AUTO: 2.09 X10(3)/MCL (ref 0.6–4.6)
LYMPHOCYTES NFR BLD AUTO: 26.6 %
MAGNESIUM SERPL-MCNC: 2.2 MG/DL (ref 1.6–2.6)
MCH RBC QN AUTO: 30 PG (ref 27–31)
MCHC RBC AUTO-ENTMCNC: 31.9 G/DL (ref 33–36)
MCV RBC AUTO: 94.1 FL (ref 80–94)
MONOCYTES # BLD AUTO: 0.78 X10(3)/MCL (ref 0.1–1.3)
MONOCYTES NFR BLD AUTO: 9.9 %
NEUTROPHILS # BLD AUTO: 4.82 X10(3)/MCL (ref 2.1–9.2)
NEUTROPHILS NFR BLD AUTO: 61.5 %
NRBC BLD AUTO-RTO: 0 %
PHOSPHATE SERPL-MCNC: 3.4 MG/DL (ref 2.3–4.7)
PLATELET # BLD AUTO: 478 X10(3)/MCL (ref 130–400)
PMV BLD AUTO: 8.3 FL (ref 7.4–10.4)
POTASSIUM SERPL-SCNC: 4.4 MMOL/L (ref 3.5–5.1)
RBC # BLD AUTO: 4.06 X10(6)/MCL (ref 4.2–5.4)
SODIUM SERPL-SCNC: 135 MMOL/L (ref 136–145)
WBC # BLD AUTO: 7.85 X10(3)/MCL (ref 4.5–11.5)

## 2024-09-26 PROCEDURE — 25000003 PHARM REV CODE 250: Performed by: NURSE PRACTITIONER

## 2024-09-26 PROCEDURE — 85025 COMPLETE CBC W/AUTO DIFF WBC: CPT | Performed by: STUDENT IN AN ORGANIZED HEALTH CARE EDUCATION/TRAINING PROGRAM

## 2024-09-26 PROCEDURE — 63600175 PHARM REV CODE 636 W HCPCS: Performed by: STUDENT IN AN ORGANIZED HEALTH CARE EDUCATION/TRAINING PROGRAM

## 2024-09-26 PROCEDURE — 25000003 PHARM REV CODE 250: Performed by: PHYSICIAN ASSISTANT

## 2024-09-26 PROCEDURE — 83735 ASSAY OF MAGNESIUM: CPT | Performed by: STUDENT IN AN ORGANIZED HEALTH CARE EDUCATION/TRAINING PROGRAM

## 2024-09-26 PROCEDURE — 36415 COLL VENOUS BLD VENIPUNCTURE: CPT | Performed by: STUDENT IN AN ORGANIZED HEALTH CARE EDUCATION/TRAINING PROGRAM

## 2024-09-26 PROCEDURE — 25000003 PHARM REV CODE 250: Performed by: STUDENT IN AN ORGANIZED HEALTH CARE EDUCATION/TRAINING PROGRAM

## 2024-09-26 PROCEDURE — 84100 ASSAY OF PHOSPHORUS: CPT | Performed by: STUDENT IN AN ORGANIZED HEALTH CARE EDUCATION/TRAINING PROGRAM

## 2024-09-26 PROCEDURE — 25000003 PHARM REV CODE 250

## 2024-09-26 PROCEDURE — 11000001 HC ACUTE MED/SURG PRIVATE ROOM

## 2024-09-26 PROCEDURE — 80048 BASIC METABOLIC PNL TOTAL CA: CPT | Performed by: STUDENT IN AN ORGANIZED HEALTH CARE EDUCATION/TRAINING PROGRAM

## 2024-09-26 RX ORDER — LIOTHYRONINE SODIUM 5 UG/1
5 TABLET ORAL
Status: DISCONTINUED | OUTPATIENT
Start: 2024-09-27 | End: 2024-09-30 | Stop reason: HOSPADM

## 2024-09-26 RX ORDER — METHOCARBAMOL 500 MG/1
500 TABLET, FILM COATED ORAL 3 TIMES DAILY PRN
Status: DISCONTINUED | OUTPATIENT
Start: 2024-09-26 | End: 2024-09-30 | Stop reason: HOSPADM

## 2024-09-26 RX ORDER — CEFAZOLIN SODIUM 2 G/50ML
2 SOLUTION INTRAVENOUS ONCE
Status: COMPLETED | OUTPATIENT
Start: 2024-09-27 | End: 2024-09-27

## 2024-09-26 RX ADMIN — BUSPIRONE HYDROCHLORIDE 5 MG: 5 TABLET ORAL at 08:09

## 2024-09-26 RX ADMIN — LEVOTHYROXINE SODIUM 88 MCG: 88 TABLET ORAL at 06:09

## 2024-09-26 RX ADMIN — METHOCARBAMOL 500 MG: 500 TABLET ORAL at 10:09

## 2024-09-26 RX ADMIN — Medication 6 MG: at 08:09

## 2024-09-26 RX ADMIN — GABAPENTIN 300 MG: 300 CAPSULE ORAL at 04:09

## 2024-09-26 RX ADMIN — SODIUM CHLORIDE: 9 INJECTION, SOLUTION INTRAVENOUS at 08:09

## 2024-09-26 RX ADMIN — METHOCARBAMOL 500 MG: 500 TABLET ORAL at 06:09

## 2024-09-26 RX ADMIN — MORPHINE SULFATE 3 MG: 4 INJECTION, SOLUTION INTRAMUSCULAR; INTRAVENOUS at 08:09

## 2024-09-26 RX ADMIN — SODIUM CHLORIDE: 9 INJECTION, SOLUTION INTRAVENOUS at 12:09

## 2024-09-26 RX ADMIN — GABAPENTIN 300 MG: 300 CAPSULE ORAL at 03:09

## 2024-09-26 RX ADMIN — LIOTHYRONINE SODIUM 5 MCG: 5 TABLET ORAL at 06:09

## 2024-09-26 RX ADMIN — BUSPIRONE HYDROCHLORIDE 5 MG: 5 TABLET ORAL at 10:09

## 2024-09-26 RX ADMIN — SODIUM CHLORIDE: 9 INJECTION, SOLUTION INTRAVENOUS at 10:09

## 2024-09-26 RX ADMIN — HYDROCODONE BITARTRATE AND ACETAMINOPHEN 1 TABLET: 7.5; 325 TABLET ORAL at 02:09

## 2024-09-26 NOTE — NURSING
Confirmed with Dr. Acosta can give the Levothyroxine and Liothyronine tomorrow before surgery with sips of water. Patient also asked if PT can come and see her before surgery as she wants to learn about the exercises Dr. Acosta agreed to call PT to teach her on exercises prior to surgery.

## 2024-09-26 NOTE — PROGRESS NOTES
No new issues.  Urinary/bowel/pain issues.  Surgery tomorrow-risks discussed again.  Consent obtained.

## 2024-09-26 NOTE — PT/OT/SLP PROGRESS
Occupational Therapy      Patient Name:  Su Hall   MRN:  [id number]    Patient not seen today secondary to pending sx c NSGY tomorrow. Will follow-up 9/27/24.    9/26/2024

## 2024-09-26 NOTE — NURSING
Nurses Note -- 4 Eyes      9/26/2024   8:29 AM      Skin assessed during: Q Shift Change      [x] No New Altered Skin Integrity Present    []Prevention Measures Documented      [] Yes- Altered Skin Integrity Present or Discovered   [] LDA Added if Not in Epic (Describe Wound)   [] New Altered Skin Integrity was Present on Admit and Documented in LDA   [] Wound Image Taken    Wound Care Consulted? No    Attending Nurse:  Alma Delia Rodriguez RN/Staff Member:   Yovana Carlson

## 2024-09-26 NOTE — PT/OT/SLP PROGRESS
Physical Therapy Treatment    Patient Name:  Su Hall   MRN:  [id number]    Patient not seen today secondary to pending sx c NSGY tomorrow. Will follow-up 9/27/24.

## 2024-09-26 NOTE — NURSING
Nurses Note -- 4 Eyes      9/25/2024   11:51 PM      Skin assessed during: Q Shift Change      [x] No Altered Skin Integrity Present    []Prevention Measures Documented      [] Yes- Altered Skin Integrity Present or Discovered   [] LDA Added if Not in Epic (Describe Wound)   [] New Altered Skin Integrity was Present on Admit and Documented in LDA   [] Wound Image Taken    Wound Care Consulted? No    Attending Nurse:  Yovana Carlson RN     Second RN/Staff Member:   Myke GREEN

## 2024-09-26 NOTE — PLAN OF CARE
Problem: Adult Inpatient Plan of Care  Goal: Plan of Care Review  Outcome: Progressing  Flowsheets (Taken 9/25/2024 2352)  Plan of Care Reviewed With:   patient   spouse  Goal: Patient-Specific Goal (Individualized)  Outcome: Progressing  Goal: Absence of Hospital-Acquired Illness or Injury  Outcome: Progressing  Intervention: Identify and Manage Fall Risk  Flowsheets (Taken 9/25/2024 2352)  Safety Promotion/Fall Prevention:   assistive device/personal item within reach   side rails raised x 2   Fall Risk signage in place  Intervention: Prevent Skin Injury  Flowsheets (Taken 9/25/2024 2352)  Body Position: position changed independently  Skin Protection: transparent dressing maintained  Device Skin Pressure Protection: tubing/devices free from skin contact  Intervention: Prevent and Manage VTE (Venous Thromboembolism) Risk  Flowsheets (Taken 9/25/2024 2352)  VTE Prevention/Management:   remove, assess skin, and reapply sequential compression device   dorsiflexion/plantar flexion performed   bleeding risk assessed   intravenous hydration  Intervention: Prevent Infection  Flowsheets (Taken 9/25/2024 2352)  Infection Prevention:   rest/sleep promoted   hand hygiene promoted  Goal: Optimal Comfort and Wellbeing  Outcome: Progressing  Intervention: Monitor Pain and Promote Comfort  Flowsheets (Taken 9/25/2024 2352)  Pain Management Interventions:   relaxation techniques promoted   care clustered  Intervention: Provide Person-Centered Care  Flowsheets (Taken 9/25/2024 2352)  Trust Relationship/Rapport:   care explained   thoughts/feelings acknowledged  Goal: Readiness for Transition of Care  Outcome: Progressing

## 2024-09-26 NOTE — PLAN OF CARE
Problem: Adult Inpatient Plan of Care  Goal: Plan of Care Review  9/26/2024 0430 by Yovana Lang RN  Outcome: Progressing  Flowsheets (Taken 9/26/2024 0430)  Plan of Care Reviewed With:   patient   spouse  9/25/2024 2352 by Yovana Lang RN  Outcome: Progressing  Flowsheets (Taken 9/25/2024 2352)  Plan of Care Reviewed With:   patient   spouse  Goal: Patient-Specific Goal (Individualized)  9/26/2024 0430 by Yovana Lang RN  Outcome: Progressing  9/25/2024 2352 by Yovana Lang RN  Outcome: Progressing  Goal: Absence of Hospital-Acquired Illness or Injury  9/26/2024 0430 by Yovana Lang RN  Outcome: Progressing  9/25/2024 2352 by Yovana Lang RN  Outcome: Progressing  Intervention: Identify and Manage Fall Risk  9/26/2024 0430 by Yovana Lang RN  Flowsheets (Taken 9/26/2024 0430)  Safety Promotion/Fall Prevention:   assistive device/personal item within reach   Fall Risk signage in place   side rails raised x 2  9/25/2024 2352 by Yovana Lang RN  Flowsheets (Taken 9/25/2024 2352)  Safety Promotion/Fall Prevention:   assistive device/personal item within reach   side rails raised x 2   Fall Risk signage in place  Intervention: Prevent Skin Injury  9/26/2024 0430 by Yovana Lang RN  Flowsheets (Taken 9/26/2024 0430)  Body Position: position changed independently  Skin Protection: transparent dressing maintained  Device Skin Pressure Protection: tubing/devices free from skin contact  9/25/2024 2352 by Yovana Lang RN  Flowsheets (Taken 9/25/2024 2352)  Body Position: position changed independently  Skin Protection: transparent dressing maintained  Device Skin Pressure Protection: tubing/devices free from skin contact  Intervention: Prevent and Manage VTE (Venous Thromboembolism) Risk  9/26/2024 0430 by Yovana Lang RN  Flowsheets (Taken 9/26/2024 0430)  VTE Prevention/Management:   remove, assess skin, and reapply sequential compression device    intravenous hydration  9/25/2024 2352 by Yovana Lang RN  Flowsheets (Taken 9/25/2024 2352)  VTE Prevention/Management:   remove, assess skin, and reapply sequential compression device   dorsiflexion/plantar flexion performed   bleeding risk assessed   intravenous hydration  Intervention: Prevent Infection  9/26/2024 0430 by Yovana Lang RN  Flowsheets (Taken 9/26/2024 0430)  Infection Prevention:   rest/sleep promoted   hand hygiene promoted  9/25/2024 2352 by Yovana Lang RN  Flowsheets (Taken 9/25/2024 2352)  Infection Prevention:   rest/sleep promoted   hand hygiene promoted  Goal: Optimal Comfort and Wellbeing  9/26/2024 0430 by Yovana Lang RN  Outcome: Progressing  9/25/2024 2352 by Yovana Lang RN  Outcome: Progressing  Intervention: Monitor Pain and Promote Comfort  9/26/2024 0430 by Yovana Lang RN  Flowsheets (Taken 9/26/2024 0430)  Pain Management Interventions:   quiet environment facilitated   care clustered  9/25/2024 2352 by Yovana Lang RN  Flowsheets (Taken 9/25/2024 2352)  Pain Management Interventions:   relaxation techniques promoted   care clustered  Intervention: Provide Person-Centered Care  9/26/2024 0430 by Yovana Lang RN  Flowsheets (Taken 9/26/2024 0430)  Trust Relationship/Rapport:   care explained   thoughts/feelings acknowledged  9/25/2024 2352 by Yovana Lang RN  Flowsheets (Taken 9/25/2024 2352)  Trust Relationship/Rapport:   care explained   thoughts/feelings acknowledged  Goal: Readiness for Transition of Care  9/26/2024 0430 by Yovana Lang RN  Outcome: Progressing  9/25/2024 2352 by Yovana Lang RN  Outcome: Progressing

## 2024-09-26 NOTE — PLAN OF CARE
Problem: Adult Inpatient Plan of Care  Goal: Plan of Care Review  9/26/2024 1654 by Alma Delia Cahnel RN  Outcome: Progressing  9/26/2024 0829 by Alma Delia Chanel RN  Outcome: Progressing  Goal: Patient-Specific Goal (Individualized)  9/26/2024 1654 by Alma Delia Chanel RN  Outcome: Progressing  9/26/2024 0829 by Alma Delia Chanel RN  Outcome: Progressing  Goal: Absence of Hospital-Acquired Illness or Injury  9/26/2024 1654 by Alma Delia Chanel RN  Outcome: Progressing  9/26/2024 0829 by Alma Delia Chanel RN  Outcome: Progressing  Goal: Optimal Comfort and Wellbeing  9/26/2024 1654 by Alma Delia Chanel RN  Outcome: Progressing  9/26/2024 0829 by Alma Delia Chanel RN  Outcome: Progressing  Goal: Readiness for Transition of Care  9/26/2024 1654 by Alma Delia Chanel RN  Outcome: Progressing  9/26/2024 0829 by Alma Delia Chanel RN  Outcome: Progressing

## 2024-09-27 ENCOUNTER — ANESTHESIA (OUTPATIENT)
Dept: SURGERY | Facility: HOSPITAL | Age: 69
DRG: 460 | End: 2024-09-27
Payer: COMMERCIAL

## 2024-09-27 ENCOUNTER — ANESTHESIA EVENT (OUTPATIENT)
Dept: SURGERY | Facility: HOSPITAL | Age: 69
DRG: 460 | End: 2024-09-27
Payer: COMMERCIAL

## 2024-09-27 LAB
ALBUMIN SERPL-MCNC: 3.9 G/DL (ref 3.4–4.8)
ALBUMIN/GLOB SERPL: 2 RATIO (ref 1.1–2)
ALP SERPL-CCNC: 141 UNIT/L (ref 40–150)
ALT SERPL-CCNC: 21 UNIT/L (ref 0–55)
ANION GAP SERPL CALC-SCNC: 8 MEQ/L
AST SERPL-CCNC: 24 UNIT/L (ref 5–34)
BILIRUB SERPL-MCNC: 0.3 MG/DL
BUN SERPL-MCNC: 11.8 MG/DL (ref 9.8–20.1)
CALCIUM SERPL-MCNC: 8.9 MG/DL (ref 8.4–10.2)
CHLORIDE SERPL-SCNC: 103 MMOL/L (ref 98–107)
CO2 SERPL-SCNC: 25 MMOL/L (ref 23–31)
CREAT SERPL-MCNC: 0.78 MG/DL (ref 0.55–1.02)
CREAT/UREA NIT SERPL: 15
GFR SERPLBLD CREATININE-BSD FMLA CKD-EPI: >60 ML/MIN/1.73/M2
GLOBULIN SER-MCNC: 2 GM/DL (ref 2.4–3.5)
GLUCOSE SERPL-MCNC: 171 MG/DL (ref 82–115)
POTASSIUM SERPL-SCNC: 4.2 MMOL/L (ref 3.5–5.1)
PROT SERPL-MCNC: 5.9 GM/DL (ref 5.8–7.6)
SODIUM SERPL-SCNC: 136 MMOL/L (ref 136–145)

## 2024-09-27 PROCEDURE — 25000003 PHARM REV CODE 250: Performed by: STUDENT IN AN ORGANIZED HEALTH CARE EDUCATION/TRAINING PROGRAM

## 2024-09-27 PROCEDURE — 25000003 PHARM REV CODE 250

## 2024-09-27 PROCEDURE — 63600175 PHARM REV CODE 636 W HCPCS: Performed by: STUDENT IN AN ORGANIZED HEALTH CARE EDUCATION/TRAINING PROGRAM

## 2024-09-27 PROCEDURE — 37000009 HC ANESTHESIA EA ADD 15 MINS: Performed by: NEUROLOGICAL SURGERY

## 2024-09-27 PROCEDURE — 25000003 PHARM REV CODE 250: Performed by: NURSE PRACTITIONER

## 2024-09-27 PROCEDURE — C1713 ANCHOR/SCREW BN/BN,TIS/BN: HCPCS | Performed by: NEUROLOGICAL SURGERY

## 2024-09-27 PROCEDURE — 0SG707Z FUSION OF RIGHT SACROILIAC JOINT WITH AUTOLOGOUS TISSUE SUBSTITUTE, OPEN APPROACH: ICD-10-PCS | Performed by: NEUROLOGICAL SURGERY

## 2024-09-27 PROCEDURE — 36620 INSERTION CATHETER ARTERY: CPT | Performed by: STUDENT IN AN ORGANIZED HEALTH CARE EDUCATION/TRAINING PROGRAM

## 2024-09-27 PROCEDURE — 27800903 OPTIME MED/SURG SUP & DEVICES OTHER IMPLANTS: Performed by: NEUROLOGICAL SURGERY

## 2024-09-27 PROCEDURE — 8E0WXBZ COMPUTER ASSISTED PROCEDURE OF TRUNK REGION: ICD-10-PCS | Performed by: NEUROLOGICAL SURGERY

## 2024-09-27 PROCEDURE — 63600175 PHARM REV CODE 636 W HCPCS: Performed by: NEUROLOGICAL SURGERY

## 2024-09-27 PROCEDURE — D9220A PRA ANESTHESIA: Mod: ANES,,, | Performed by: STUDENT IN AN ORGANIZED HEALTH CARE EDUCATION/TRAINING PROGRAM

## 2024-09-27 PROCEDURE — 71000016 HC POSTOP RECOV ADDL HR: Performed by: NEUROLOGICAL SURGERY

## 2024-09-27 PROCEDURE — 11000001 HC ACUTE MED/SURG PRIVATE ROOM

## 2024-09-27 PROCEDURE — 63600175 PHARM REV CODE 636 W HCPCS

## 2024-09-27 PROCEDURE — 36000713 HC OR TIME LEV V EA ADD 15 MIN: Performed by: NEUROLOGICAL SURGERY

## 2024-09-27 PROCEDURE — 25000003 PHARM REV CODE 250: Performed by: NEUROLOGICAL SURGERY

## 2024-09-27 PROCEDURE — 0SG807Z FUSION OF LEFT SACROILIAC JOINT WITH AUTOLOGOUS TISSUE SUBSTITUTE, OPEN APPROACH: ICD-10-PCS | Performed by: NEUROLOGICAL SURGERY

## 2024-09-27 PROCEDURE — 00NY0ZZ RELEASE LUMBAR SPINAL CORD, OPEN APPROACH: ICD-10-PCS | Performed by: NEUROLOGICAL SURGERY

## 2024-09-27 PROCEDURE — 71000015 HC POSTOP RECOV 1ST HR: Performed by: NEUROLOGICAL SURGERY

## 2024-09-27 PROCEDURE — P9047 ALBUMIN (HUMAN), 25%, 50ML: HCPCS | Mod: JZ,JG

## 2024-09-27 PROCEDURE — 37000008 HC ANESTHESIA 1ST 15 MINUTES: Performed by: NEUROLOGICAL SURGERY

## 2024-09-27 PROCEDURE — 01NR0ZZ RELEASE SACRAL NERVE, OPEN APPROACH: ICD-10-PCS | Performed by: NEUROLOGICAL SURGERY

## 2024-09-27 PROCEDURE — 01NB0ZZ RELEASE LUMBAR NERVE, OPEN APPROACH: ICD-10-PCS | Performed by: NEUROLOGICAL SURGERY

## 2024-09-27 PROCEDURE — 71000033 HC RECOVERY, INTIAL HOUR: Performed by: NEUROLOGICAL SURGERY

## 2024-09-27 PROCEDURE — 71000039 HC RECOVERY, EACH ADD'L HOUR: Performed by: NEUROLOGICAL SURGERY

## 2024-09-27 PROCEDURE — 36415 COLL VENOUS BLD VENIPUNCTURE: CPT | Performed by: STUDENT IN AN ORGANIZED HEALTH CARE EDUCATION/TRAINING PROGRAM

## 2024-09-27 PROCEDURE — 0SG3071 FUSION OF LUMBOSACRAL JOINT WITH AUTOLOGOUS TISSUE SUBSTITUTE, POSTERIOR APPROACH, POSTERIOR COLUMN, OPEN APPROACH: ICD-10-PCS | Performed by: NEUROLOGICAL SURGERY

## 2024-09-27 PROCEDURE — D9220A PRA ANESTHESIA: Mod: CRNA,,,

## 2024-09-27 PROCEDURE — 27201423 OPTIME MED/SURG SUP & DEVICES STERILE SUPPLY: Performed by: NEUROLOGICAL SURGERY

## 2024-09-27 PROCEDURE — 80053 COMPREHEN METABOLIC PANEL: CPT | Performed by: STUDENT IN AN ORGANIZED HEALTH CARE EDUCATION/TRAINING PROGRAM

## 2024-09-27 PROCEDURE — 36000712 HC OR TIME LEV V 1ST 15 MIN: Performed by: NEUROLOGICAL SURGERY

## 2024-09-27 PROCEDURE — 0SG0071 FUSION OF LUMBAR VERTEBRAL JOINT WITH AUTOLOGOUS TISSUE SUBSTITUTE, POSTERIOR APPROACH, POSTERIOR COLUMN, OPEN APPROACH: ICD-10-PCS | Performed by: NEUROLOGICAL SURGERY

## 2024-09-27 DEVICE — FILLER BONE SYN 1CC PARTIC: Type: IMPLANTABLE DEVICE | Site: SPINE LUMBAR | Status: FUNCTIONAL

## 2024-09-27 DEVICE — COLLAGEN DURAL REGENERATION MEMBRANE 1IN X 1IN (2.5CM X 2.5CM)
Type: IMPLANTABLE DEVICE | Site: SPINE LUMBAR | Status: FUNCTIONAL
Brand: DURAMATRIX-ONLAY PLUS

## 2024-09-27 RX ORDER — METHOCARBAMOL 100 MG/ML
1000 INJECTION, SOLUTION INTRAMUSCULAR; INTRAVENOUS ONCE
Status: DISCONTINUED | OUTPATIENT
Start: 2024-09-27 | End: 2024-09-27 | Stop reason: HOSPADM

## 2024-09-27 RX ORDER — PROPOFOL 10 MG/ML
VIAL (ML) INTRAVENOUS
Status: DISCONTINUED | OUTPATIENT
Start: 2024-09-27 | End: 2024-09-27

## 2024-09-27 RX ORDER — MIDAZOLAM HYDROCHLORIDE 1 MG/ML
INJECTION INTRAMUSCULAR; INTRAVENOUS
Status: DISCONTINUED | OUTPATIENT
Start: 2024-09-27 | End: 2024-09-27

## 2024-09-27 RX ORDER — PHENYLEPHRINE HYDROCHLORIDE 10 MG/ML
INJECTION INTRAVENOUS
Status: DISCONTINUED | OUTPATIENT
Start: 2024-09-27 | End: 2024-09-27

## 2024-09-27 RX ORDER — ONDANSETRON HYDROCHLORIDE 2 MG/ML
INJECTION, SOLUTION INTRAVENOUS
Status: DISCONTINUED | OUTPATIENT
Start: 2024-09-27 | End: 2024-09-27

## 2024-09-27 RX ORDER — MORPHINE SULFATE 4 MG/ML
1 INJECTION, SOLUTION INTRAMUSCULAR; INTRAVENOUS
Status: DISCONTINUED | OUTPATIENT
Start: 2024-09-27 | End: 2024-09-30 | Stop reason: HOSPADM

## 2024-09-27 RX ORDER — ALUMINUM HYDROXIDE, MAGNESIUM HYDROXIDE, AND SIMETHICONE 1200; 120; 1200 MG/30ML; MG/30ML; MG/30ML
30 SUSPENSION ORAL EVERY 4 HOURS PRN
Status: DISCONTINUED | OUTPATIENT
Start: 2024-09-27 | End: 2024-09-30 | Stop reason: HOSPADM

## 2024-09-27 RX ORDER — OXYCODONE AND ACETAMINOPHEN 10; 325 MG/1; MG/1
1 TABLET ORAL EVERY 4 HOURS PRN
Status: DISCONTINUED | OUTPATIENT
Start: 2024-09-27 | End: 2024-09-30 | Stop reason: HOSPADM

## 2024-09-27 RX ORDER — ROCURONIUM BROMIDE 10 MG/ML
INJECTION, SOLUTION INTRAVENOUS
Status: DISCONTINUED | OUTPATIENT
Start: 2024-09-27 | End: 2024-09-27

## 2024-09-27 RX ORDER — HYDROCODONE BITARTRATE AND ACETAMINOPHEN 10; 325 MG/1; MG/1
1 TABLET ORAL EVERY 4 HOURS PRN
Status: DISCONTINUED | OUTPATIENT
Start: 2024-09-27 | End: 2024-09-30 | Stop reason: HOSPADM

## 2024-09-27 RX ORDER — SODIUM CHLORIDE 9 MG/ML
INJECTION, SOLUTION INTRAVENOUS CONTINUOUS
Status: DISCONTINUED | OUTPATIENT
Start: 2024-09-27 | End: 2024-09-28

## 2024-09-27 RX ORDER — AMOXICILLIN 250 MG
2 CAPSULE ORAL 2 TIMES DAILY
Status: DISCONTINUED | OUTPATIENT
Start: 2024-09-27 | End: 2024-09-30 | Stop reason: HOSPADM

## 2024-09-27 RX ORDER — MUPIROCIN 20 MG/G
OINTMENT TOPICAL 2 TIMES DAILY
Status: DISPENSED | OUTPATIENT
Start: 2024-09-27 | End: 2024-09-29

## 2024-09-27 RX ORDER — LIDOCAINE HYDROCHLORIDE 10 MG/ML
INJECTION, SOLUTION EPIDURAL; INFILTRATION; INTRACAUDAL; PERINEURAL
Status: DISCONTINUED | OUTPATIENT
Start: 2024-09-27 | End: 2024-09-27

## 2024-09-27 RX ORDER — CEFAZOLIN SODIUM 1 G/3ML
INJECTION, POWDER, FOR SOLUTION INTRAMUSCULAR; INTRAVENOUS
Status: DISCONTINUED | OUTPATIENT
Start: 2024-09-27 | End: 2024-09-27 | Stop reason: HOSPADM

## 2024-09-27 RX ORDER — DEXAMETHASONE SODIUM PHOSPHATE 4 MG/ML
INJECTION, SOLUTION INTRA-ARTICULAR; INTRALESIONAL; INTRAMUSCULAR; INTRAVENOUS; SOFT TISSUE
Status: DISCONTINUED | OUTPATIENT
Start: 2024-09-27 | End: 2024-09-27

## 2024-09-27 RX ORDER — MORPHINE SULFATE 4 MG/ML
2 INJECTION, SOLUTION INTRAMUSCULAR; INTRAVENOUS
Status: DISCONTINUED | OUTPATIENT
Start: 2024-09-27 | End: 2024-09-30 | Stop reason: HOSPADM

## 2024-09-27 RX ORDER — SODIUM CHLORIDE 0.9 % (FLUSH) 0.9 %
10 SYRINGE (ML) INJECTION
Status: DISCONTINUED | OUTPATIENT
Start: 2024-09-27 | End: 2024-09-27 | Stop reason: HOSPADM

## 2024-09-27 RX ORDER — BISACODYL 10 MG/1
10 SUPPOSITORY RECTAL DAILY
Status: DISCONTINUED | OUTPATIENT
Start: 2024-09-27 | End: 2024-09-30 | Stop reason: HOSPADM

## 2024-09-27 RX ORDER — GLYCOPYRROLATE 0.2 MG/ML
INJECTION INTRAMUSCULAR; INTRAVENOUS
Status: DISCONTINUED | OUTPATIENT
Start: 2024-09-27 | End: 2024-09-27

## 2024-09-27 RX ORDER — ACETAMINOPHEN 10 MG/ML
INJECTION, SOLUTION INTRAVENOUS
Status: DISCONTINUED | OUTPATIENT
Start: 2024-09-27 | End: 2024-09-27

## 2024-09-27 RX ORDER — CEFAZOLIN SODIUM 2 G/50ML
2 SOLUTION INTRAVENOUS
Status: COMPLETED | OUTPATIENT
Start: 2024-09-27 | End: 2024-09-28

## 2024-09-27 RX ORDER — PROCHLORPERAZINE EDISYLATE 5 MG/ML
10 INJECTION INTRAMUSCULAR; INTRAVENOUS EVERY 6 HOURS PRN
Status: DISCONTINUED | OUTPATIENT
Start: 2024-09-27 | End: 2024-09-30 | Stop reason: HOSPADM

## 2024-09-27 RX ORDER — HYDROMORPHONE HYDROCHLORIDE 2 MG/ML
INJECTION, SOLUTION INTRAMUSCULAR; INTRAVENOUS; SUBCUTANEOUS
Status: DISCONTINUED | OUTPATIENT
Start: 2024-09-27 | End: 2024-09-27

## 2024-09-27 RX ORDER — CALCIUM CARBONATE 200(500)MG
500 TABLET,CHEWABLE ORAL DAILY PRN
Status: DISCONTINUED | OUTPATIENT
Start: 2024-09-27 | End: 2024-09-30 | Stop reason: HOSPADM

## 2024-09-27 RX ORDER — GLUCAGON 1 MG
1 KIT INJECTION
Status: DISCONTINUED | OUTPATIENT
Start: 2024-09-27 | End: 2024-09-27 | Stop reason: HOSPADM

## 2024-09-27 RX ORDER — HYDROMORPHONE HYDROCHLORIDE 2 MG/ML
0.4 INJECTION, SOLUTION INTRAMUSCULAR; INTRAVENOUS; SUBCUTANEOUS EVERY 5 MIN PRN
Status: DISCONTINUED | OUTPATIENT
Start: 2024-09-27 | End: 2024-09-27 | Stop reason: HOSPADM

## 2024-09-27 RX ORDER — HYDROCODONE BITARTRATE AND ACETAMINOPHEN 5; 325 MG/1; MG/1
1 TABLET ORAL EVERY 4 HOURS PRN
Status: DISCONTINUED | OUTPATIENT
Start: 2024-09-27 | End: 2024-09-30 | Stop reason: HOSPADM

## 2024-09-27 RX ORDER — LIDOCAINE HYDROCHLORIDE AND EPINEPHRINE 5; 5 MG/ML; UG/ML
INJECTION, SOLUTION INFILTRATION; PERINEURAL
Status: DISCONTINUED | OUTPATIENT
Start: 2024-09-27 | End: 2024-09-27 | Stop reason: HOSPADM

## 2024-09-27 RX ORDER — MORPHINE SULFATE 4 MG/ML
4 INJECTION, SOLUTION INTRAMUSCULAR; INTRAVENOUS
Status: DISCONTINUED | OUTPATIENT
Start: 2024-09-27 | End: 2024-09-30 | Stop reason: HOSPADM

## 2024-09-27 RX ORDER — FENTANYL CITRATE 50 UG/ML
INJECTION, SOLUTION INTRAMUSCULAR; INTRAVENOUS
Status: DISCONTINUED | OUTPATIENT
Start: 2024-09-27 | End: 2024-09-27

## 2024-09-27 RX ORDER — ALBUMIN HUMAN 250 G/1000ML
SOLUTION INTRAVENOUS
Status: DISCONTINUED | OUTPATIENT
Start: 2024-09-27 | End: 2024-09-27

## 2024-09-27 RX ADMIN — HYDROMORPHONE HYDROCHLORIDE 0.4 MG: 2 INJECTION INTRAMUSCULAR; INTRAVENOUS; SUBCUTANEOUS at 02:09

## 2024-09-27 RX ADMIN — ROCURONIUM BROMIDE 50 MG: 10 SOLUTION INTRAVENOUS at 10:09

## 2024-09-27 RX ADMIN — SODIUM CHLORIDE: 9 INJECTION, SOLUTION INTRAVENOUS at 05:09

## 2024-09-27 RX ADMIN — LIOTHYRONINE SODIUM 5 MCG: 5 TABLET ORAL at 05:09

## 2024-09-27 RX ADMIN — ACETAMINOPHEN 700 MG: 10 INJECTION, SOLUTION INTRAVENOUS at 11:09

## 2024-09-27 RX ADMIN — CEFAZOLIN SODIUM 2 G: 2 SOLUTION INTRAVENOUS at 10:09

## 2024-09-27 RX ADMIN — HYDROMORPHONE HYDROCHLORIDE 0.4 MG: 2 INJECTION INTRAMUSCULAR; INTRAVENOUS; SUBCUTANEOUS at 03:09

## 2024-09-27 RX ADMIN — PROPOFOL 20 MG: 10 INJECTION, EMULSION INTRAVENOUS at 12:09

## 2024-09-27 RX ADMIN — SODIUM CHLORIDE, SODIUM GLUCONATE, SODIUM ACETATE, POTASSIUM CHLORIDE AND MAGNESIUM CHLORIDE: 526; 502; 368; 37; 30 INJECTION, SOLUTION INTRAVENOUS at 12:09

## 2024-09-27 RX ADMIN — PROPOFOL 60 MG: 10 INJECTION, EMULSION INTRAVENOUS at 11:09

## 2024-09-27 RX ADMIN — HYDROMORPHONE HYDROCHLORIDE 0.2 MG: 2 INJECTION, SOLUTION INTRAMUSCULAR; INTRAVENOUS; SUBCUTANEOUS at 01:09

## 2024-09-27 RX ADMIN — FENTANYL CITRATE 100 MCG: 50 INJECTION, SOLUTION INTRAMUSCULAR; INTRAVENOUS at 10:09

## 2024-09-27 RX ADMIN — GLYCOPYRROLATE 0.2 MG: 0.2 INJECTION INTRAMUSCULAR; INTRAVENOUS at 10:09

## 2024-09-27 RX ADMIN — DEXAMETHASONE SODIUM PHOSPHATE 8 MG: 4 INJECTION, SOLUTION INTRA-ARTICULAR; INTRALESIONAL; INTRAMUSCULAR; INTRAVENOUS; SOFT TISSUE at 10:09

## 2024-09-27 RX ADMIN — PHENYLEPHRINE HYDROCHLORIDE 200 MCG: 10 INJECTION INTRAVENOUS at 10:09

## 2024-09-27 RX ADMIN — OXYCODONE AND ACETAMINOPHEN 1 TABLET: 10; 325 TABLET ORAL at 08:09

## 2024-09-27 RX ADMIN — LIDOCAINE HYDROCHLORIDE 60 MG: 10 INJECTION, SOLUTION EPIDURAL; INFILTRATION; INTRACAUDAL; PERINEURAL at 10:09

## 2024-09-27 RX ADMIN — SENNOSIDES AND DOCUSATE SODIUM 2 TABLET: 50; 8.6 TABLET ORAL at 08:09

## 2024-09-27 RX ADMIN — ALBUMIN (HUMAN) 100 ML: 12.5 SOLUTION INTRAVENOUS at 10:09

## 2024-09-27 RX ADMIN — PHENYLEPHRINE HYDROCHLORIDE 25 MCG/MIN: 10 INJECTION INTRAVENOUS at 10:09

## 2024-09-27 RX ADMIN — GABAPENTIN 300 MG: 300 CAPSULE ORAL at 03:09

## 2024-09-27 RX ADMIN — SODIUM CHLORIDE, SODIUM GLUCONATE, SODIUM ACETATE, POTASSIUM CHLORIDE AND MAGNESIUM CHLORIDE: 526; 502; 368; 37; 30 INJECTION, SOLUTION INTRAVENOUS at 10:09

## 2024-09-27 RX ADMIN — BUSPIRONE HYDROCHLORIDE 5 MG: 5 TABLET ORAL at 07:09

## 2024-09-27 RX ADMIN — SUGAMMADEX 100 MG: 100 INJECTION, SOLUTION INTRAVENOUS at 11:09

## 2024-09-27 RX ADMIN — LEVOTHYROXINE SODIUM 88 MCG: 88 TABLET ORAL at 05:09

## 2024-09-27 RX ADMIN — MIDAZOLAM HYDROCHLORIDE 2 MG: 1 INJECTION, SOLUTION INTRAMUSCULAR; INTRAVENOUS at 10:09

## 2024-09-27 RX ADMIN — BUSPIRONE HYDROCHLORIDE 5 MG: 5 TABLET ORAL at 08:09

## 2024-09-27 RX ADMIN — GABAPENTIN 300 MG: 300 CAPSULE ORAL at 05:09

## 2024-09-27 RX ADMIN — METHOCARBAMOL 500 MG: 500 TABLET ORAL at 02:09

## 2024-09-27 RX ADMIN — MUPIROCIN: 20 OINTMENT TOPICAL at 08:09

## 2024-09-27 RX ADMIN — HYDROMORPHONE HYDROCHLORIDE 0.4 MG: 2 INJECTION, SOLUTION INTRAMUSCULAR; INTRAVENOUS; SUBCUTANEOUS at 12:09

## 2024-09-27 RX ADMIN — SUGAMMADEX 100 MG: 100 INJECTION, SOLUTION INTRAVENOUS at 12:09

## 2024-09-27 RX ADMIN — METHOCARBAMOL 500 MG: 500 TABLET ORAL at 05:09

## 2024-09-27 RX ADMIN — PROPOFOL 100 MG: 10 INJECTION, EMULSION INTRAVENOUS at 12:09

## 2024-09-27 RX ADMIN — PROPOFOL 120 MG: 10 INJECTION, EMULSION INTRAVENOUS at 10:09

## 2024-09-27 RX ADMIN — CEFAZOLIN SODIUM 2 G: 2 SOLUTION INTRAVENOUS at 06:09

## 2024-09-27 RX ADMIN — ONDANSETRON 4 MG: 2 INJECTION INTRAMUSCULAR; INTRAVENOUS at 12:09

## 2024-09-27 NOTE — PROGRESS NOTES
[facility] - Periop Services  Neurosurgery  Progress Note    Subjective:     Interval History:     Postop day 1. Status post posterior L4-S2 decompression and fusion    Patient is sitting up in bed with her  at the bedside.    She is drastically improved from my initial assessment as far as pain goes.    She has some incisional pain to her lower back and buttocks but much improved.    She does have some vaginal numbness at times.  Her incision is clean and dry   She has a MELISSA in place that has put out 90 mL overnight.  280 mL in 24 hours.  Serosanguineous  H&H is 9.8 and 30.2.  She has ambulated with physical therapy.    Post-Op Info:  Procedure(s) (LRB):  FUSION, SPINE, LUMBAR, PLIF, USING COMPUTER-ASSISTED NAVIGATION (N/A)   Day of Surgery      Medications:  Continuous Infusions:   0.9% NaCl   Intravenous Continuous 100 mL/hr at 09/27/24 0540 New Bag at 09/27/24 0540    0.9% NaCl   Intravenous Continuous         Scheduled Meds:   bisacodyL  10 mg Rectal Daily    busPIRone  5 mg Oral BID    ceFAZolin (Ancef) IV (PEDS and ADULTS)  2 g Intravenous Q8H    gabapentin  300 mg Oral BID    levothyroxine  88 mcg Oral Before breakfast    liothyronine  5 mcg Oral Before breakfast    methocarbamoL  1,000 mg Intravenous Once    mupirocin   Nasal BID    senna-docusate 8.6-50 mg  2 tablet Oral BID     PRN Meds:  Current Facility-Administered Medications:     acetaminophen, 650 mg, Oral, Q8H PRN    acetaminophen, 650 mg, Oral, Q4H PRN    aluminum-magnesium hydroxide-simethicone, 30 mL, Oral, Q4H PRN    calcium carbonate, 500 mg, Oral, Daily PRN    dextrose 10%, 12.5 g, Intravenous, PRN    dextrose 10%, 12.5 g, Intravenous, PRN    dextrose 10%, 25 g, Intravenous, PRN    dextrose 10%, 25 g, Intravenous, PRN    diphenhydrAMINE, 25 mg, Intravenous, Q6H PRN    glucagon (human recombinant), 1 mg, Intramuscular, PRN    glucagon (human recombinant), 1 mg, Intramuscular, PRN    glucose, 16 g, Oral, PRN    glucose, 24  g, Oral, PRN    HYDROcodone-acetaminophen, 1 tablet, Oral, Q4H PRN    HYDROcodone-acetaminophen, 1 tablet, Oral, Q4H PRN    HYDROmorphone, 0.4 mg, Intravenous, Q5 Min PRN    lorazepam, 2 mg, Intravenous, Once PRN    melatonin, 6 mg, Oral, Nightly PRN    methocarbamoL, 500 mg, Oral, TID PRN    morphine, 1 mg, Intravenous, Q1H PRN    morphine, 2 mg, Intravenous, Q1H PRN    morphine, 4 mg, Intravenous, Q3H PRN    ondansetron, 4 mg, Intravenous, Q4H PRN    oxyCODONE-acetaminophen, 1 tablet, Oral, Q4H PRN    prochlorperazine, 10 mg, Intravenous, Q6H PRN    sodium chloride 0.9%, 10 mL, Intravenous, Q12H PRN    sodium chloride 0.9%, 10 mL, Intravenous, PRN     Review of Systems  Objective:     Weight: 46.4 kg (102 lb 4.8 oz)  Body mass index is 18.12 kg/m².  Vital Signs (Most Recent):  Temp: 97.7 °F (36.5 °C) (09/27/24 1318)  Pulse: 70 (09/27/24 1550)  Resp: 14 (09/27/24 1550)  BP: (!) 142/80 (09/27/24 1550)  SpO2: 97 % (09/27/24 1550) Vital Signs (24h Range):  Temp:  [97.5 °F (36.4 °C)-98.1 °F (36.7 °C)] 97.7 °F (36.5 °C)  Pulse:  [62-90] 70  Resp:  [10-21] 14  SpO2:  [93 %-100 %] 97 %  BP: (100-157)/(41-87) 142/80     Date 09/27/24 0700 - 09/28/24 0659   Shift 8108-8758 8994-3197 7588-3059 24 Hour Total   INTAKE   I.V.(mL/kg) 200(4.3)   200(4.3)   Blood 100   100   IV Piggyback 1619.6   1619.6   Shift Total(mL/kg) 1919.6(41.4)   1919.6(41.4)   OUTPUT   Drains 80 50  130   Blood 300   300   Shift Total(mL/kg) 380(8.2) 50(1.1)  430(9.3)   Weight (kg) 46.4 46.4 46.4 46.4                            Closed/Suction Drain 09/27/24 Inferior;Medial Back Bulb 10 Fr. (Active)   Dressing Type Gauze;Other (Comment) 09/27/24 1316   Dressing Status New drainage;Dry;Intact 09/27/24 1316   Dressing Intervention First dressing 09/27/24 1316   Drainage Serosanguineous 09/27/24 1316   Status To bulb suction 09/27/24 1316   Output (mL) 50 mL 09/27/24 1502            Urethral Catheter 09/27/24 1053 Silicone 16 Fr. (Active)   Site Assessment  "Clean;Intact;Dry 09/27/24 1316   Collection Container Urimeter 09/27/24 1316   Securement Method secured to top of thigh w/ adhesive device 09/27/24 1316       Neurosurgery Physical Exam    GCS 15   Awake and conversive   Moving all extremities equally and strong 5/5 to upper and lower extremity muscle groups with some guarding to lower extremities.    Some vaginal numbness reported  Some numbness to bilateral posterior thighs    Significant Labs:  Recent Labs   Lab 09/26/24  0339   *   K 4.4      CO2 28   BUN 10.8   CREATININE 0.68   CALCIUM 8.9   MG 2.20     Recent Labs   Lab 09/26/24  0339   WBC 7.85   HGB 12.2   HCT 38.2   *     No results for input(s): "LABPT", "INR", "APTT" in the last 48 hours.  Microbiology Results (last 7 days)       ** No results found for the last 168 hours. **              Assessment/Plan:    Continue PT/OT   Case management for home assistance   Continue multimodal pain control   Continue MELISSA   Bowel regimen   Fall precautions   SCDs     Had a very extensive discussion and answered all of patient and her 's questions today.     Active Diagnoses:    Diagnosis Date Noted POA    PRINCIPAL PROBLEM:  Closed minimally displaced zone III fracture of sacrum [S32.131A] 09/25/2024 Yes    Moderate malnutrition [E44.0] 09/25/2024 Yes      Problems Resolved During this Admission:       Yehuda Brooke, [facility]  Neurosurgery  [facility]    "

## 2024-09-27 NOTE — ANESTHESIA PREPROCEDURE EVALUATION
09/27/2024  Su Hall is a 68 y.o., female.    12 / 38 / 478k  Na 135, K 4.4, Cr 0.68  Type and screen active          Pre-op Assessment    I have reviewed the Patient Summary Reports.     I have reviewed the Nursing Notes. I have reviewed the NPO Status.   I have reviewed the Medications.     Review of Systems  Anesthesia Hx:  No problems with previous Anesthesia                Cardiovascular:  Exercise tolerance: poor                                           Hepatic/GI:          Malnutrition  underweight          Endocrine:   Hypothyroidism              Physical Exam  General: Cooperative  Frail appearing  Airway:  Mallampati: II   Mouth Opening: Normal  TM Distance: Normal  Tongue: Normal  Neck ROM: Normal ROM    Chest/Lungs:  Normal Respiratory Rate    Heart:  Rate: Normal        Anesthesia Plan  Type of Anesthesia, risks & benefits discussed:    Anesthesia Type: Gen ETT  Intra-op Monitoring Plan: Standard ASA Monitors and Art Line  Post Op Pain Control Plan: multimodal analgesia  Induction:  IV  Informed Consent: Informed consent signed with the Patient and all parties understand the risks and agree with anesthesia plan.  All questions answered.   ASA Score: 2  Day of Surgery Review of History & Physical: H&P Update referred to the surgeon/provider.    Ready For Surgery From Anesthesia Perspective.     .

## 2024-09-27 NOTE — PROGRESS NOTES
[facility] Hospital Medicine Progress Note        Chief Complaint: Inpatient Follow-up for     HPI:   68 y.o. White female with a past medical history of hypertension, hyperlipidemia, anxiety/depression, hypothyroidism. The patient presented to [facility] on 9/24/2024 with a primary complaint of bilateral hip pain for the last month with intermittent numbness down the legs bilaterally.     Patient has been doing physical therapy for the last 3 months due to a pulled Plavix floor muscle.  She has been using a walker to ambulate but now is having difficulty ambulating with the walker.     Upon presentation to the ED, temperature 98.3° F, heart rate 87, blood pressure 147/83, respiratory rate 20 and SpO2 99% on room air.  Labs with sodium 132, alkaline phosphatase 190, , troponin less than 0.01, TSH 5.2, alcohol level less than 10.  CT of the head with no acute intracranial findings.  CT of the lumbar spine with bilateral L5 transverse process fractures which are displaced and bilateral complex comminuted fractures of the sacrum.  CT of the pelvis revealed comminuted fracture of the sacrum with anterolisthesis of S1 on S2 measuring 9.4 mm, subtitle fracture of the pubic symphysis of the right side inferiorly and transverse process fracture at L5 on the right and left side.  In the ED patient received methocarbamol, morphine and Zofran.  She is admitted to hospital medicine services for further medical management. Neurosurgery on-board; plan for surgical intervention on 09/27.  Orthopedics was consulted; recommending no surgery from their end.  PT/OT on board.    Interval Hx:   Today, patient continued to complain of lower back pain as well as B/L LE pain and numbness with difficulty with ambulation.    Case was discussed with patient's nurse on the floor.    Objective/physical exam:  General: alert lady lying comfortably in bed, in no acute distress  HENT: oral and oropharyngeal mucosa  moist, pink, with no erythema or exudates, no ear pain or discharge  Neck: normal neck movement, no lymph nodes or swellings, no JVD or Carotid bruit  Respiratory: clear breathing sounds bilaterally, no crackles, rales, ronchi or wheezes  Cardiovascular: clear S1 and S2, no murmurs, rubs or gallops  Peripheral Vascular: no lesions, ulcers or erosions, normal peripheral pulses and no pedal edema  Gastrointestinal: soft, non-tender, non-distended abdomen, no guarding, rigidity or rebound tenderness, normal bowel sounds  Integumentary: normal skin color, no rashes or lesions  Neuro: AAO x 3; motor strength 5/5 in B/L UEs & LEs; sensation intact to gross and fine touch B/L; CN II-XII grossly intact    VITAL SIGNS: 24 HRS MIN & MAX LAST   Temp  Min: 97 °F (36.1 °C)  Max: 98.9 °F (37.2 °C) 98.9 °F (37.2 °C)   BP  Min: 108/60  Max: 159/79 108/60   Pulse  Min: 69  Max: 82  82   Resp  Min: 18  Max: 20 20   SpO2  Min: 97 %  Max: 100 % 97 %     I have reviewed the following labs:  Recent Labs   Lab 09/24/24  1055 09/25/24  0408 09/26/24  0339   WBC 9.28 9.05 7.85   RBC 4.31 3.83* 4.06*   HGB 12.9 11.5* 12.2   HCT 39.2 34.8* 38.2   MCV 91.0 90.9 94.1*   MCH 29.9 30.0 30.0   MCHC 32.9* 33.0 31.9*   RDW 12.0 12.2 12.1   * 458* 478*   MPV 8.1 8.4 8.3     Recent Labs   Lab 09/24/24  1055 09/25/24  0337 09/26/24  0339   * 131* 135*   K 3.8 4.0 4.4   CL 98 99 101   CO2 24 25 28   BUN 14.6 13.4 10.8   CREATININE 0.67 0.65 0.68   CALCIUM 9.2 8.7 8.9   MG 2.20  --  2.20   ALBUMIN 3.5 3.2*  --    ALKPHOS 190* 172*  --    ALT 22 20  --    AST 27 22  --    BILITOT 0.4 0.3  --      Assessment/Plan:  Bilateral L5 transverse process fracture  Bilateral comminuted fracture of the sacrum   Anterolisthesis of S1 on S2  Right inferior pubic symphysis fracture  Elevated alkaline phosphatase   Hyponatremia    Continues to be admitted   Continues to endorse lower back pain and B/L LE pain/numbness   Neurosurgery on board; planning for  surgical intervention on 09/27   Patient to be kept NPO overnight   PT/OT on board   On IV  mL/hour   Continued on Buspirone 5 mg b.i.d., gabapentin 300 mg b.i.d., levothyroxine 88 mcg, liothyronine 5 mcg  P.r.n. Robaxin t.i.d., p.r.n. morphine 3 mg q.6 ordered    VTE prophylaxis: SCDs    Patient condition:  StablE    Anticipated discharge and Disposition:     Pending    All diagnosis and differential diagnosis have been reviewed; assessment and plan has been documented; I have personally reviewed the labs and test results that are presently available; I have reviewed the patients medication list; I have reviewed the consulting providers response and recommendations. I have reviewed or attempted to review medical records based upon their availability    All of the patient's questions have been  addressed and answered. Patient's is agreeable to the above stated plan. I will continue to monitor closely and make adjustments to medical management as needed.    Portions of this note dictated using EMR integrated voice recognition software, and may be subject to voice recognition errors not corrected at proofreading. Please contact writer for clarification if needed.   _____________________________________________________________________    Radiology:  I have personally reviewed the following imaging and agree with the radiologist.     MRI Lumbar Spine Without Contrast  Narrative: EXAMINATION:  MRI LUMBAR SPINE WITHOUT CONTRAST    CLINICAL HISTORY:  cauda equina;    TECHNIQUE:  Multiplanar multisequence MR images of the lumbar spine are obtained without contrast.    COMPARISON:  CT lumbar spine dated 09/24/2024    FINDINGS:  There are 5 non-rib-bearing lumbar type vertebra is.  Alignment is normal.  The vertebral body heights are maintained.  There are a few scattered vertebral body hemangiomas.  There are bilateral sacral fractures, with approximately 6 mm of bony retropulsion at the level of S1-S2.  This causes  focal severe stenosis of the distal canal with likely encroachment on the descending bilateral S2 nerve roots, and narrowing of the bilateral S1-S2 neural foramina, right greater than left.  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    The conus terminates at the level of L1.  It is normal in signal and contour.  There is no epidural fluid collection.    Disc spaces, spinal canal and neural foramina are as follows:    L1-L2: No disc herniation.  No significant spinal canal or neural foraminal stenosis.    L2-L3: No disc herniation.  No significant spinal canal or neural foraminal stenosis.    L3-L4: Disc bulge and facet hypertrophy with mild narrowing of the spinal canal.  No significant neural foraminal stenosis.    L4-L5: Disc bulge and facet hypertrophy with mild narrowing of the spinal canal.  Mild bilateral foraminal stenosis.    L5-S1: No disc herniation.  Bilateral facet hypertrophy.  No significant spinal canal or neural foraminal stenosis.    No significant abnormality within the visualized paraspinous musculature.  Impression: 1. 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  Bony retropulsion, severe focal narrowing of the distal spinal canal and bilateral S1-S2 neural foraminal narrowing.  2. Mild degenerative narrowing spinal canal at L3-L5.  3. Multilevel neural foraminal stenoses as described.    Electronically signed by: Viviane Villarreal  Date:    09/25/2024  Time:    12:04      Ollie Munoz MD  Department of Hospital Medicine   [facility]   09/26/2024

## 2024-09-27 NOTE — ANESTHESIA PROCEDURE NOTES
Intubation    Date/Time: 9/27/2024 10:17 AM    Performed by: Lakesha Red CRNA  Authorized by: Julian Dang MD    Intubation:     Induction:  Intravenous    Intubated:  Postinduction    Mask Ventilation:  Easy mask    Attempts:  1    Attempted By:  CRNA    Method of Intubation:  Direct    Blade:  Viramontes 2    Laryngeal View Grade: Grade IIA - cords partially seen      Difficult Airway Encountered?: No      Complications:  None    Airway Device:  Oral endotracheal tube    Airway Device Size:  7.0    Style/Cuff Inflation:  Cuffed (inflated to minimal occlusive pressure)    Inflation Amount (mL):  6    Tube secured:  22    Secured at:  The lips    Placement Verified By:  Capnometry    Complicating Factors:  None    Findings Post-Intubation:  BS equal bilateral and atraumatic/condition of teeth unchanged

## 2024-09-27 NOTE — PLAN OF CARE
Problem: Adult Inpatient Plan of Care  Goal: Plan of Care Review  9/27/2024 0648 by Alyx Goodman RN  Outcome: Progressing  9/27/2024 0645 by Alyx Goodman RN  Outcome: Progressing  Goal: Patient-Specific Goal (Individualized)  9/27/2024 0648 by Alyx Goodman RN  Outcome: Progressing  9/27/2024 0645 by Alyx Goodman RN  Outcome: Progressing  Goal: Absence of Hospital-Acquired Illness or Injury  9/27/2024 0648 by Alyx Goodman RN  Outcome: Progressing  9/27/2024 0645 by Alyx Goodman RN  Outcome: Progressing  Goal: Optimal Comfort and Wellbeing  9/27/2024 0648 by Alyx Goodman RN  Outcome: Progressing  9/27/2024 0645 by Alyx Goodman RN  Outcome: Progressing  Goal: Readiness for Transition of Care  9/27/2024 0648 by Alyx Goodman RN  Outcome: Progressing  9/27/2024 0645 by Alyx Goodman RN  Outcome: Progressing     Problem: Fall Injury Risk  Goal: Absence of Fall and Fall-Related Injury  9/27/2024 0648 by Alyx Goodman RN  Outcome: Progressing  9/27/2024 0645 by Alyx Goodman RN  Outcome: Progressing     Problem: Pain Acute  Goal: Optimal Pain Control and Function  9/27/2024 0648 by Alyx Goodman RN  Outcome: Progressing  9/27/2024 0645 by Alyx Goodman RN  Outcome: Progressing     Problem: Anxiety  Goal: Anxiety Reduction or Resolution  9/27/2024 0648 by Alyx Goodman RN  Outcome: Progressing  9/27/2024 0645 by Alyx Goodman RN  Outcome: Progressing

## 2024-09-27 NOTE — TRANSFER OF CARE
"Anesthesia Transfer of Care Note    Patient: Su Hall    Procedure(s) Performed: Procedure(s) (LRB):  FUSION, SPINE, LUMBAR, PLIF, USING COMPUTER-ASSISTED NAVIGATION (N/A)    Patient location: PACU    Anesthesia Type: general    Transport from OR: Transported from OR on room air with adequate spontaneous ventilation    Post pain: adequate analgesia    Post assessment: no apparent anesthetic complications and tolerated procedure well    Post vital signs: stable    Level of consciousness: sedated    Nausea/Vomiting: no nausea/vomiting    Complications: none    Transfer of care protocol was followed      Last vitals: Visit Vitals  BP (!) 100/58 (BP Location: Left arm, Patient Position: Lying)   Pulse 65   Temp 36.5 °C (97.7 °F)   Resp 14   Ht 5' 3" (1.6 m)   Wt 46.4 kg (102 lb 4.8 oz)   SpO2 99%   BMI 18.12 kg/m²     "

## 2024-09-27 NOTE — PLAN OF CARE
Problem: Adult Inpatient Plan of Care  Goal: Plan of Care Review  Outcome: Progressing  Goal: Patient-Specific Goal (Individualized)  Outcome: Progressing  Goal: Absence of Hospital-Acquired Illness or Injury  Outcome: Progressing  Goal: Optimal Comfort and Wellbeing  Outcome: Progressing  Goal: Readiness for Transition of Care  Outcome: Progressing     Problem: Fall Injury Risk  Goal: Absence of Fall and Fall-Related Injury  Outcome: Progressing     Problem: Pain Acute  Goal: Optimal Pain Control and Function  Outcome: Progressing     Problem: Anxiety  Goal: Anxiety Reduction or Resolution  Outcome: Progressing

## 2024-09-27 NOTE — NURSING
Nurses Note -- 4 Eyes      9/27/2024   5:27 PM      Skin assessed during: Transfer      [x] No Altered Skin Integrity Present    [x]Prevention Measures Documented      [] Yes- Altered Skin Integrity Present or Discovered   [] LDA Added if Not in Epic (Describe Wound)   [] New Altered Skin Integrity was Present on Admit and Documented in LDA   [] Wound Image Taken    Wound Care Consulted? No    Attending Nurse:  Rand Rodriguez RN/Staff Member:   Sandee

## 2024-09-27 NOTE — ANESTHESIA PROCEDURE NOTES
Arterial    Diagnosis: Closed minimally displaced zone III fracture of sacrum, initial encounter [S32.131A]    Patient location during procedure: pre-op  Timeout: 9/27/2024 8:25 AM  Procedure end time: 9/27/2024 8:28 AM    Staffing  Authorizing Provider: Julian Dang MD  Performing Provider: Julian Dang MD    Staffing  Performed by: Julian Dang MD  Authorized by: Julian Dang MD    Anesthesiologist was present at the time of the procedure.    Preanesthetic Checklist  Completed: patient identified, IV checked, site marked, risks and benefits discussed, surgical consent, monitors and equipment checked, pre-op evaluation, timeout performed and anesthesia consent givenArterial  Skin Prep: chlorhexidine gluconate  Local Infiltration: lidocaine  Orientation: left  Location: radial    Catheter Size: 20 G  Catheter placement by Ultrasound guidance. Heme positive aspiration all ports.   Vessel Caliber: medium, patent, compressibility normal  Vascular Doppler:  not done  Needle advanced into vessel with real time Ultrasound guidance.Insertion Attempts: 1  Assessment  Dressing: secured with tape and tegaderm  Patient: Tolerated well

## 2024-09-27 NOTE — PROGRESS NOTES
[facility]  Hospital Medicine Progress Note        Chief Complaint: Inpatient Follow-up     HPI:   68 y.o. White female with a past medical history of hypertension, hyperlipidemia, anxiety/depression, hypothyroidism. The patient presented to [facility] on 9/24/2024 with a primary complaint of bilateral hip pain for the last month with intermittent numbness down the legs bilaterally.     Patient has been doing physical therapy for the last 3 months due to a pulled Plavix floor muscle.  She has been using a walker to ambulate but now is having difficulty ambulating with the walker.     Upon presentation to the ED, temperature 98.3° F, heart rate 87, blood pressure 147/83, respiratory rate 20 and SpO2 99% on room air.  Labs with sodium 132, alkaline phosphatase 190, , troponin less than 0.01, TSH 5.2, alcohol level less than 10.  CT of the head with no acute intracranial findings.  CT of the lumbar spine with bilateral L5 transverse process fractures which are displaced and bilateral complex comminuted fractures of the sacrum.  CT of the pelvis revealed comminuted fracture of the sacrum with anterolisthesis of S1 on S2 measuring 9.4 mm, subtitle fracture of the pubic symphysis of the right side inferiorly and transverse process fracture at L5 on the right and left side.  In the ED patient received methocarbamol, morphine and Zofran.  She is admitted to hospital medicine services for further medical management. Neurosurgery on-board; plan for surgical intervention on 09/27.  Orthopedics was consulted; recommending no surgery from their end.  PT/OT on board.    Interval Hx:   Today, patient was taken to the OR for surgery.  Undergoing L4-S2 decompression and fusion. Had not returned to her room on multiple attempts to see patient.  PT to continue working with patient.    Case was discussed with patient's nurse on the floor.    VITAL SIGNS: 24 HRS MIN & MAX LAST   Temp  Min: 97.5 °F (36.4  °C)  Max: 98.9 °F (37.2 °C) 97.7 °F (36.5 °C)   BP  Min: 100/58  Max: 143/77 (!) 140/87   Pulse  Min: 62  Max: 87  76   Resp  Min: 11  Max: 21 13   SpO2  Min: 96 %  Max: 100 % 100 %     I have reviewed the following labs:  Recent Labs   Lab 09/24/24  1055 09/25/24  0408 09/26/24  0339   WBC 9.28 9.05 7.85   RBC 4.31 3.83* 4.06*   HGB 12.9 11.5* 12.2   HCT 39.2 34.8* 38.2   MCV 91.0 90.9 94.1*   MCH 29.9 30.0 30.0   MCHC 32.9* 33.0 31.9*   RDW 12.0 12.2 12.1   * 458* 478*   MPV 8.1 8.4 8.3     Recent Labs   Lab 09/24/24  1055 09/25/24  0337 09/26/24  0339   * 131* 135*   K 3.8 4.0 4.4   CL 98 99 101   CO2 24 25 28   BUN 14.6 13.4 10.8   CREATININE 0.67 0.65 0.68   CALCIUM 9.2 8.7 8.9   MG 2.20  --  2.20   ALBUMIN 3.5 3.2*  --    ALKPHOS 190* 172*  --    ALT 22 20  --    AST 27 22  --    BILITOT 0.4 0.3  --      Assessment/Plan:  Bilateral L5 transverse process fracture  Bilateral comminuted fracture of the sacrum   Anterolisthesis of S1 on S2  Right inferior pubic symphysis fracture  Elevated alkaline phosphatase   Hyponatremia    Continues to be admitted   Neurosurgery on board; performing L4-S2 decompression and fusion today  Kept NPO overnight   PT/OT on board; will follow recommendations  On IV  mL/hour   Continued on Buspirone 5 mg b.i.d., gabapentin 300 mg b.i.d., levothyroxine 88 mcg, liothyronine 5 mcg  P.r.n. Robaxin t.i.d., p.r.n. morphine 3 mg q.6 ordered    VTE prophylaxis: SCDs    Patient condition:  StablE    Anticipated discharge and Disposition:     Pending    All diagnosis and differential diagnosis have been reviewed; assessment and plan has been documented; I have personally reviewed the labs and test results that are presently available; I have reviewed the patients medication list; I have reviewed the consulting providers response and recommendations. I have reviewed or attempted to review medical records based upon their availability    All of the patient's questions have been   addressed and answered. Patient's is agreeable to the above stated plan. I will continue to monitor closely and make adjustments to medical management as needed.    Portions of this note dictated using EMR integrated voice recognition software, and may be subject to voice recognition errors not corrected at proofreading. Please contact writer for clarification if needed.   _____________________________________________________________________    Radiology:  I have personally reviewed the following imaging and agree with the radiologist.     MRI Lumbar Spine Without Contrast  Narrative: EXAMINATION:  MRI LUMBAR SPINE WITHOUT CONTRAST    CLINICAL HISTORY:  cauda equina;    TECHNIQUE:  Multiplanar multisequence MR images of the lumbar spine are obtained without contrast.    COMPARISON:  CT lumbar spine dated 09/24/2024    FINDINGS:  There are 5 non-rib-bearing lumbar type vertebra is.  Alignment is normal.  The vertebral body heights are maintained.  There are a few scattered vertebral body hemangiomas.  There are bilateral sacral fractures, with approximately 6 mm of bony retropulsion at the level of S1-S2.  This causes focal severe stenosis of the distal canal with likely encroachment on the descending bilateral S2 nerve roots, and narrowing of the bilateral S1-S2 neural foramina, right greater than left.  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    The conus terminates at the level of L1.  It is normal in signal and contour.  There is no epidural fluid collection.    Disc spaces, spinal canal and neural foramina are as follows:    L1-L2: No disc herniation.  No significant spinal canal or neural foraminal stenosis.    L2-L3: No disc herniation.  No significant spinal canal or neural foraminal stenosis.    L3-L4: Disc bulge and facet hypertrophy with mild narrowing of the spinal canal.  No significant neural foraminal stenosis.    L4-L5: Disc bulge and facet hypertrophy with mild narrowing of  the spinal canal.  Mild bilateral foraminal stenosis.    L5-S1: No disc herniation.  Bilateral facet hypertrophy.  No significant spinal canal or neural foraminal stenosis.    No significant abnormality within the visualized paraspinous musculature.  Impression: 1. 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  Bony retropulsion, severe focal narrowing of the distal spinal canal and bilateral S1-S2 neural foraminal narrowing.  2. Mild degenerative narrowing spinal canal at L3-L5.  3. Multilevel neural foraminal stenoses as described.    Electronically signed by: Viviane Villarreal  Date:    09/25/2024  Time:    12:04      Ollie Munoz MD  Department of Hospital Medicine   [facility]   09/27/2024

## 2024-09-27 NOTE — BRIEF OP NOTE
[facility] - Periop Services  Brief Operative Note    SUMMARY     Surgery Date: 9/27/2024     Surgeons and Role:     * Luis Carlos Acotsa MD - Primary    Assisting Surgeon: Armani Klein PA-C    Pre-op Diagnosis:  Neurogenic claudication due to lumbar spinal stenosis [M48.062]  Closed minimally displaced zone III fracture of sacrum, initial encounter [S32.131A]    Post-op Diagnosis:  Post-Op Diagnosis Codes:     * Neurogenic claudication due to lumbar spinal stenosis [M48.062]     * Closed minimally displaced zone III fracture of sacrum, initial encounter [S32.131A]    L4-S2 decompression and fusion using Atec Invictus screws (Alin L4: 6.5x45; Alin L5: 6.5x45; Alin S2: 7.5x80) and osteoamp DBM. O arm and microscope assisted.     Procedure(s) (LRB):  FUSION, SPINE, LUMBAR, PLIF, USING COMPUTER-ASSISTED NAVIGATION (N/A)    Anesthesia: General    Implants:  Implant Name Type Inv. Item Serial No.  Lot No. LRB No. Used Action   FILLER BONE SYN 1CC PARTIC - SNA  FILLER BONE SYN 1CC PARTIC NA [employer] [id number] N/A 4 Implanted   OSTEOAMP SELECT FIBERS 10CC   [id number]  NA N/A 1 Implanted   OSTEOAMP SELECT FIBERS 10CC   [id number]  NA N/A 1 Implanted   ADHERU AUTO DURAL SEALANT     [id number] N/A 1 Implanted   DURAMATRIX ONLAY PLUS 1X1 - [id number]  DURAMATRIX ONLAY PLUS 1X1  [employer] [id number] N/A 1 Implanted   SCREW INVICTUS SET      N/A 6 Implanted   SCREW INVICTUS POLYAXIAL 6.5 X45 MM      N/A 4 Implanted   RIAN INVICTYS TI LORDOTIC 5.5 X80 MM      N/A 2 Implanted   SCREW INVCITUS FRANKIE ILIAC FA 7.5 X80MM      N/A 1 Implanted       Operative Findings: Dictated    Estimated Blood Loss: 300 mL    Estimated Blood Loss has been documented.         Specimens:   Specimen (24h ago, onward)      None            [id number]

## 2024-09-27 NOTE — PLAN OF CARE
Problem: Adult Inpatient Plan of Care  Goal: Plan of Care Review  Outcome: Progressing  Goal: Absence of Hospital-Acquired Illness or Injury  Outcome: Progressing  Goal: Optimal Comfort and Wellbeing  Outcome: Progressing  Goal: Readiness for Transition of Care  Outcome: Progressing     Problem: Pain Acute  Goal: Optimal Pain Control and Function  Outcome: Progressing

## 2024-09-28 LAB
ANION GAP SERPL CALC-SCNC: 6 MEQ/L
BASOPHILS # BLD AUTO: 0.02 X10(3)/MCL
BASOPHILS NFR BLD AUTO: 0.2 %
BUN SERPL-MCNC: 12.6 MG/DL (ref 9.8–20.1)
CALCIUM SERPL-MCNC: 8.6 MG/DL (ref 8.4–10.2)
CHLORIDE SERPL-SCNC: 102 MMOL/L (ref 98–107)
CO2 SERPL-SCNC: 27 MMOL/L (ref 23–31)
CREAT SERPL-MCNC: 0.76 MG/DL (ref 0.55–1.02)
CREAT/UREA NIT SERPL: 17
EOSINOPHIL # BLD AUTO: 0.05 X10(3)/MCL (ref 0–0.9)
EOSINOPHIL NFR BLD AUTO: 0.5 %
ERYTHROCYTE [DISTWIDTH] IN BLOOD BY AUTOMATED COUNT: 12.3 % (ref 11.5–17)
GFR SERPLBLD CREATININE-BSD FMLA CKD-EPI: >60 ML/MIN/1.73/M2
GLUCOSE SERPL-MCNC: 103 MG/DL (ref 82–115)
HCT VFR BLD AUTO: 30.2 % (ref 37–47)
HGB BLD-MCNC: 9.8 G/DL (ref 12–16)
IMM GRANULOCYTES # BLD AUTO: 0.06 X10(3)/MCL (ref 0–0.04)
IMM GRANULOCYTES NFR BLD AUTO: 0.6 %
LYMPHOCYTES # BLD AUTO: 2.33 X10(3)/MCL (ref 0.6–4.6)
LYMPHOCYTES NFR BLD AUTO: 21.6 %
MCH RBC QN AUTO: 29.6 PG (ref 27–31)
MCHC RBC AUTO-ENTMCNC: 32.5 G/DL (ref 33–36)
MCV RBC AUTO: 91.2 FL (ref 80–94)
MONOCYTES # BLD AUTO: 1.16 X10(3)/MCL (ref 0.1–1.3)
MONOCYTES NFR BLD AUTO: 10.8 %
NEUTROPHILS # BLD AUTO: 7.16 X10(3)/MCL (ref 2.1–9.2)
NEUTROPHILS NFR BLD AUTO: 66.3 %
NRBC BLD AUTO-RTO: 0 %
PLATELET # BLD AUTO: 393 X10(3)/MCL (ref 130–400)
PMV BLD AUTO: 8.3 FL (ref 7.4–10.4)
POTASSIUM SERPL-SCNC: 3.8 MMOL/L (ref 3.5–5.1)
RBC # BLD AUTO: 3.31 X10(6)/MCL (ref 4.2–5.4)
SODIUM SERPL-SCNC: 135 MMOL/L (ref 136–145)
WBC # BLD AUTO: 10.78 X10(3)/MCL (ref 4.5–11.5)

## 2024-09-28 PROCEDURE — 97162 PT EVAL MOD COMPLEX 30 MIN: CPT

## 2024-09-28 PROCEDURE — 63600175 PHARM REV CODE 636 W HCPCS

## 2024-09-28 PROCEDURE — 97116 GAIT TRAINING THERAPY: CPT

## 2024-09-28 PROCEDURE — 25000003 PHARM REV CODE 250: Performed by: NURSE PRACTITIONER

## 2024-09-28 PROCEDURE — 36415 COLL VENOUS BLD VENIPUNCTURE: CPT

## 2024-09-28 PROCEDURE — 25000003 PHARM REV CODE 250

## 2024-09-28 PROCEDURE — 11000001 HC ACUTE MED/SURG PRIVATE ROOM

## 2024-09-28 PROCEDURE — 99024 POSTOP FOLLOW-UP VISIT: CPT | Mod: ,,, | Performed by: NEUROLOGICAL SURGERY

## 2024-09-28 PROCEDURE — 25000003 PHARM REV CODE 250: Performed by: STUDENT IN AN ORGANIZED HEALTH CARE EDUCATION/TRAINING PROGRAM

## 2024-09-28 PROCEDURE — 97166 OT EVAL MOD COMPLEX 45 MIN: CPT

## 2024-09-28 PROCEDURE — 97535 SELF CARE MNGMENT TRAINING: CPT

## 2024-09-28 PROCEDURE — 97530 THERAPEUTIC ACTIVITIES: CPT

## 2024-09-28 PROCEDURE — 80048 BASIC METABOLIC PNL TOTAL CA: CPT

## 2024-09-28 PROCEDURE — 85025 COMPLETE CBC W/AUTO DIFF WBC: CPT

## 2024-09-28 RX ADMIN — LIOTHYRONINE SODIUM 5 MCG: 5 TABLET ORAL at 04:09

## 2024-09-28 RX ADMIN — MUPIROCIN: 20 OINTMENT TOPICAL at 08:09

## 2024-09-28 RX ADMIN — GABAPENTIN 300 MG: 300 CAPSULE ORAL at 04:09

## 2024-09-28 RX ADMIN — CEFAZOLIN SODIUM 2 G: 2 SOLUTION INTRAVENOUS at 11:09

## 2024-09-28 RX ADMIN — BUSPIRONE HYDROCHLORIDE 5 MG: 5 TABLET ORAL at 09:09

## 2024-09-28 RX ADMIN — METHOCARBAMOL 500 MG: 500 TABLET ORAL at 06:09

## 2024-09-28 RX ADMIN — MUPIROCIN: 20 OINTMENT TOPICAL at 09:09

## 2024-09-28 RX ADMIN — OXYCODONE AND ACETAMINOPHEN 1 TABLET: 10; 325 TABLET ORAL at 08:09

## 2024-09-28 RX ADMIN — OXYCODONE AND ACETAMINOPHEN 1 TABLET: 10; 325 TABLET ORAL at 09:09

## 2024-09-28 RX ADMIN — LEVOTHYROXINE SODIUM 88 MCG: 88 TABLET ORAL at 04:09

## 2024-09-28 RX ADMIN — BISACODYL 10 MG: 10 SUPPOSITORY RECTAL at 03:09

## 2024-09-28 RX ADMIN — BUSPIRONE HYDROCHLORIDE 5 MG: 5 TABLET ORAL at 08:09

## 2024-09-28 RX ADMIN — CEFAZOLIN SODIUM 2 G: 2 SOLUTION INTRAVENOUS at 02:09

## 2024-09-28 RX ADMIN — GABAPENTIN 300 MG: 300 CAPSULE ORAL at 02:09

## 2024-09-28 RX ADMIN — OXYCODONE AND ACETAMINOPHEN 1 TABLET: 10; 325 TABLET ORAL at 02:09

## 2024-09-28 RX ADMIN — SENNOSIDES AND DOCUSATE SODIUM 2 TABLET: 50; 8.6 TABLET ORAL at 09:09

## 2024-09-28 NOTE — PT/OT/SLP EVAL
Occupational Therapy   Evaluation and Discharge Note    Name: Su Hall  MRN: [id number]    Recommendations:     Discharge therapy intensity: No Therapy Indicated   Discharge Equipment Recommendations: shower chair  Barriers to discharge:  None    Assessment:     Su Hall is a 68 y.o. female with a medical diagnosis of bilateral L5 TP fracture, bilateral comminuted fracture of the sacrum, anterolisthesis of S1 on S2, right inferior pubic symphysis fracture. S/p L4-S2 decompression and fusion. On eval, patient presents with SBA for all functional mobility and self care tasks. Skilled OT services are not warranted at this time.    Plan:     OT to sign off as acute OT services are not warranted at this time.  Please re-consult if situation changes during this hospitalization.    Plan of Care Reviewed with: patient, spouse    Subjective     Chief Complaint: none  Patient/Family Comments/goals: return to PLOF    Occupational Profile:  Living Environment: lives with  in [facility], small step to enter; walk in shower  Previous level of function: independent - started being mod I/SBA  ~2 wks ago 2/2 weakness  Roles and Routines: wife, mother  Equipment Used at Home: walker, rolling (onset two weeks ago)  Assistance upon Discharge: family    Pain/Comfort:  Pain Rating 1: 0/10    Patients cultural, spiritual, Hindu conflicts given the current situation: no    Objective:     OT communicated with NSG prior to session.      Patient was found HOB elevated with peripheral IV, MELISSA drain upon OT entry to room.    General Precautions: Standard, fall  Orthopedic Precautions: spinal precautions, RLE weight bearing as tolerated, LLE weight bearing as tolerated  Braces: LSO  Room air  Vital Signs: 128/73    Bed Mobility:    Patient completed Rolling/Turning to Left with  stand by assistance  Patient completed Supine to Sit with stand by assistance  Patient completed Sit to Supine with stand by assistance    Functional  Mobility/Transfers:  Patient completed Sit <> Stand Transfer with stand by assistance  with  rolling walker   Patient completed Toilet Transfer Step Transfer technique with stand by assistance with  rolling walker  Functional Mobility: ambulating with SBA using RW    Activities of Daily Living:  Grooming: stand by assistance to perform hair routine standing at sink with RW  Upper Body Dressing: stand by assistance to doff/don LSO  Lower Body Dressing: stand by assistance to doff/don shoes    Functional Cognition:  Orientation: oriented to Person, Place, Time, and Situation    Visual Perceptual Skills:  Intact    Upper Extremity Function:  Right Upper Extremity:   WFL    Left Upper Extremity:  WFL    Therapeutic Positioning  Risk for acquired pressure injuries is increased due to relative decrease in mobility d/t hospitalization  and impaired mobility.    OT interventions performed during the course of today's session in an effort to prevent and/or reduce acquired pressure injuries:   Education was provided on benefits of and recommendations for therapeutic positioning  Therapeutic positioning was provided at the conclusion of session to offload all bony prominences for the prevention and/or reduction of pressure injuries and for pain management    Skin assessment: all bony prominences were assessed    Findings: known area of altered skin integrity at surgical site    OT recommendations for therapeutic positioning throughout hospitalization:   Follow [facility] Pressure Injury Prevention Protocol    Patient Education:  Patient and spouse were provided with EXTENSIVE verbal education, handouts, and demonstrations education regarding OT role/goals/POC, post op precautions, fall prevention, safety awareness, Discharge/DME recommendations, pressure ulcer prevention, and home exercise program .  Understanding was verbalized.     Patient left HOB elevated with all lines intact, call button in reach, and NSG and neurosx NP  present.    History:     Past Medical History:   Diagnosis Date    Hypercholesteremia     Hypothyroidism, unspecified     Migraine     Palpitations     Trigger thumb, right thumb          Past Surgical History:   Procedure Laterality Date    Caesarean section      COLONOSCOPY      D&C - Dilatation and curettage      History of esophagogastroduodenoscopy (EGD)      Release Right Hand Tendon, Open Approach (01/14/2019)      Release Trigger Finger Or Thumb (Right) (01/14/2019)      Tendon sheath incision (eg, for trigger finger) (01/14/2019)         Time Tracking:     OT Date of Treatment:    OT Start Time: 1114  OT Stop Time: 1155  OT Total Time (min): 41 min    Billable Minutes:Evaluation mod  Self Care/Home Management 2    9/28/2024

## 2024-09-28 NOTE — PT/OT/SLP EVAL
Physical Therapy Evaluation and Discharge Note    Patient Name:  Su Hall   MRN:  [id number]    Recommendations:     Discharge therapy intensity: Moderate Intensity Therapy   Discharge Equipment Recommendations: walker, rolling   Barriers to discharge: None    Assessment:     Su Hall is a 68 y.o. female admitted with a medical diagnosis of L4-S2 PLIF. The pt is able to perform all mobility with SBA. Pt was educated extensively on spinal pxns and log roll technique, as well as donning/doffing brace.  At this time, patient is functioning at their prior level of function and does not require further acute PT services. Pt is safe to dc home with assist from . PT to sign off.     Recent Surgery: Procedure(s) (LRB):  FUSION, SPINE, LUMBAR, PLIF, USING COMPUTER-ASSISTED NAVIGATION (N/A) 1 Day Post-Op    Plan:     During this hospitalization, patient does not require further acute PT services.  Please re-consult if situation changes.      Subjective     Chief Complaint: none  Patient/Family Comments/goals: return to PLOF  Pain/Comfort:  Location 1: back    Patients cultural, spiritual, Oriental orthodox conflicts given the current situation:      Living Environment:  Home with spouse,  home.  Prior to admission, patients level of function was independent.  Equipment used at home: at baseline none.  DME owned (not currently used): standard walker.  Upon discharge, patient will have assistance from .    Objective:     Communicated with NSG prior to session.  Patient found supine with MELISSA drain upon PT entry to room.    General Precautions: Standard,      Orthopedic Precautions:spinal precautions   Braces: LSO (OOB)  Respiratory Status: Room air  Blood Pressure: NA     Exams:  RLE ROM: WFL  RLE Strength: WFL  LLE ROM: WFL  LLE Strength: WFL    Functional Mobility:  Bed Mobility:     Supine to Sit: stand by assistance- with verbal cues for technique  Sit to Supine: stand by assistance  Transfers:     Sit to  Stand:  stand by assistance with rolling walker  Toilet Transfer: stand by assistance with  rolling walker  using  Step Transfer  Gait: pt ambulates x 15 feet, 100 feet with Rw and SBA. Verbal cues for walker proximity provided.    Pt given HEP to perform at home.    Patient provided with verbal education education regarding PT role/goals/POC, safety awareness, and discharge/DME recommendations.  Understanding was verbalized.     Patient left HOB elevated with all lines intact, call button in reach, and NSG notified.    GOALS:   Multidisciplinary Problems       Physical Therapy Goals       Not on file                    History:     Past Medical History:   Diagnosis Date    Hypercholesteremia     Hypothyroidism, unspecified     Migraine     Palpitations     Trigger thumb, right thumb        Past Surgical History:   Procedure Laterality Date    Caesarean section      COLONOSCOPY      D&C - Dilatation and curettage      History of esophagogastroduodenoscopy (EGD)      Release Right Hand Tendon, Open Approach (01/14/2019)      Release Trigger Finger Or Thumb (Right) (01/14/2019)      Tendon sheath incision (eg, for trigger finger) (01/14/2019)         Time Tracking:     PT Received On: 09/28/24  PT Start Time: 1114     PT Stop Time: 1156  PT Total Time (min): 42 min     Billable Minutes: Evaluation 15, Gait Training 15, and Therapeutic Activity 12      09/28/2024

## 2024-09-28 NOTE — PROGRESS NOTES
[facility] Hospital Medicine Progress Note        Chief Complaint: Inpatient Follow-up     HPI:   68 y.o. White female with a past medical history of hypertension, hyperlipidemia, anxiety/depression, hypothyroidism. The patient presented to [facility] on 9/24/2024 with a primary complaint of bilateral hip pain for the last month with intermittent numbness down the legs bilaterally.     Patient has been doing physical therapy for the last 3 months due to a pulled Plavix floor muscle.  She has been using a walker to ambulate but now is having difficulty ambulating with the walker.     Upon presentation to the ED, temperature 98.3° F, heart rate 87, blood pressure 147/83, respiratory rate 20 and SpO2 99% on room air.  Labs with sodium 132, alkaline phosphatase 190, , troponin less than 0.01, TSH 5.2, alcohol level less than 10.  CT of the head with no acute intracranial findings.  CT of the lumbar spine with bilateral L5 transverse process fractures which are displaced and bilateral complex comminuted fractures of the sacrum.  CT of the pelvis revealed comminuted fracture of the sacrum with anterolisthesis of S1 on S2 measuring 9.4 mm, subtitle fracture of the pubic symphysis of the right side inferiorly and transverse process fracture at L5 on the right and left side.  In the ED patient received methocarbamol, morphine and Zofran.  She is admitted to hospital medicine services for further medical management. Neurosurgery on-board; plan for surgical intervention on 09/27.  Orthopedics was consulted; recommending no surgery from their end.  PT/OT on board.  Underwent L4-S2 decompression and fusion 9/27.    Interval Hx:   Today, patient stated she was feeling better and had no new complaints.  Was seen yesterday after her surgery in PACU.  Was still somnolent from sedation.  Will await PT/OT recommendations to start planning for DC.    Case was discussed with patient's nurse on the  floor.    VITAL SIGNS: 24 HRS MIN & MAX LAST   Temp  Min: 97.7 °F (36.5 °C)  Max: 98.5 °F (36.9 °C) 98.2 °F (36.8 °C)   BP  Min: 97/57  Max: 157/72 128/73   Pulse  Min: 62  Max: 90  73   Resp  Min: 10  Max: 21 18   SpO2  Min: 93 %  Max: 100 % 100 %     I have reviewed the following labs:  Recent Labs   Lab 09/25/24  0408 09/26/24  0339 09/28/24  0523   WBC 9.05 7.85 10.78   RBC 3.83* 4.06* 3.31*   HGB 11.5* 12.2 9.8*   HCT 34.8* 38.2 30.2*   MCV 90.9 94.1* 91.2   MCH 30.0 30.0 29.6   MCHC 33.0 31.9* 32.5*   RDW 12.2 12.1 12.3   * 478* 393   MPV 8.4 8.3 8.3     Recent Labs   Lab 09/24/24  1055 09/25/24  0337 09/26/24  0339 09/27/24  1843 09/28/24  0523   * 131* 135* 136 135*   K 3.8 4.0 4.4 4.2 3.8   CL 98 99 101 103 102   CO2 24 25 28 25 27   BUN 14.6 13.4 10.8 11.8 12.6   CREATININE 0.67 0.65 0.68 0.78 0.76   CALCIUM 9.2 8.7 8.9 8.9 8.6   MG 2.20  --  2.20  --   --    ALBUMIN 3.5 3.2*  --  3.9  --    ALKPHOS 190* 172*  --  141  --    ALT 22 20  --  21  --    AST 27 22  --  24  --    BILITOT 0.4 0.3  --  0.3  --      Assessment/Plan:  Bilateral L5 transverse process fracture  Bilateral comminuted fracture of the sacrum   Anterolisthesis of S1 on S2  Right inferior pubic symphysis fracture    Continues to be admitted   No new complaints  Neurosurgery on board; performed L4-S2 decompression and fusion 9/27  PT/OT on board; will follow recommendations  Continued on Buspirone 5 mg b.i.d., gabapentin 300 mg b.i.d., levothyroxine 88 mcg, liothyronine 5 mcg  P.r.n. Robaxin t.i.d., p.r.n. morphine 3 mg q.6 ordered  Awaiting return of bowel function post surgery    VTE prophylaxis: SCDs    Patient condition:  StablE    Anticipated discharge and Disposition:     Pending    All diagnosis and differential diagnosis have been reviewed; assessment and plan has been documented; I have personally reviewed the labs and test results that are presently available; I have reviewed the patients medication list; I have  reviewed the consulting providers response and recommendations. I have reviewed or attempted to review medical records based upon their availability    All of the patient's questions have been  addressed and answered. Patient's is agreeable to the above stated plan. I will continue to monitor closely and make adjustments to medical management as needed.    Portions of this note dictated using EMR integrated voice recognition software, and may be subject to voice recognition errors not corrected at proofreading. Please contact writer for clarification if needed.   _____________________________________________________________________    Radiology:  I have personally reviewed the following imaging and agree with the radiologist.     SURG FL Surgery Fluoro Usage  See OP Notes for results.     IMPRESSION: See OP Notes for results.     This procedure was auto-finalized by: Virtual Radiologist      Ollie Munoz MD  Department of Hospital Medicine   [facility]   09/28/2024

## 2024-09-29 PROCEDURE — 11000001 HC ACUTE MED/SURG PRIVATE ROOM

## 2024-09-29 PROCEDURE — 99024 POSTOP FOLLOW-UP VISIT: CPT | Mod: ,,, | Performed by: NEUROLOGICAL SURGERY

## 2024-09-29 PROCEDURE — 25000003 PHARM REV CODE 250: Performed by: STUDENT IN AN ORGANIZED HEALTH CARE EDUCATION/TRAINING PROGRAM

## 2024-09-29 PROCEDURE — 25000003 PHARM REV CODE 250

## 2024-09-29 PROCEDURE — 25000003 PHARM REV CODE 250: Performed by: NURSE PRACTITIONER

## 2024-09-29 RX ADMIN — BUSPIRONE HYDROCHLORIDE 5 MG: 5 TABLET ORAL at 08:09

## 2024-09-29 RX ADMIN — LEVOTHYROXINE SODIUM 88 MCG: 88 TABLET ORAL at 06:09

## 2024-09-29 RX ADMIN — MUPIROCIN: 20 OINTMENT TOPICAL at 10:09

## 2024-09-29 RX ADMIN — GABAPENTIN 300 MG: 300 CAPSULE ORAL at 03:09

## 2024-09-29 RX ADMIN — BISACODYL 10 MG: 10 SUPPOSITORY RECTAL at 10:09

## 2024-09-29 RX ADMIN — OXYCODONE AND ACETAMINOPHEN 1 TABLET: 10; 325 TABLET ORAL at 06:09

## 2024-09-29 RX ADMIN — BUSPIRONE HYDROCHLORIDE 5 MG: 5 TABLET ORAL at 10:09

## 2024-09-29 RX ADMIN — SENNOSIDES AND DOCUSATE SODIUM 2 TABLET: 50; 8.6 TABLET ORAL at 10:09

## 2024-09-29 RX ADMIN — GABAPENTIN 300 MG: 300 CAPSULE ORAL at 04:09

## 2024-09-29 RX ADMIN — OXYCODONE AND ACETAMINOPHEN 1 TABLET: 10; 325 TABLET ORAL at 11:09

## 2024-09-29 RX ADMIN — LIOTHYRONINE SODIUM 5 MCG: 5 TABLET ORAL at 05:09

## 2024-09-29 RX ADMIN — OXYCODONE AND ACETAMINOPHEN 1 TABLET: 10; 325 TABLET ORAL at 03:09

## 2024-09-29 RX ADMIN — METHOCARBAMOL 500 MG: 500 TABLET ORAL at 04:09

## 2024-09-29 RX ADMIN — Medication 6 MG: at 08:09

## 2024-09-29 NOTE — PROGRESS NOTES
[facility] Hospital Medicine Progress Note        Chief Complaint: Inpatient Follow-up     HPI:   68 y.o. White female with a past medical history of hypertension, hyperlipidemia, anxiety/depression, hypothyroidism. The patient presented to [facility] on 9/24/2024 with a primary complaint of bilateral hip pain for the last month with intermittent numbness down the legs bilaterally.     Patient has been doing physical therapy for the last 3 months due to a pulled Plavix floor muscle.  She has been using a walker to ambulate but now is having difficulty ambulating with the walker.     Upon presentation to the ED, temperature 98.3° F, heart rate 87, blood pressure 147/83, respiratory rate 20 and SpO2 99% on room air.  Labs with sodium 132, alkaline phosphatase 190, , troponin less than 0.01, TSH 5.2, alcohol level less than 10.  CT of the head with no acute intracranial findings.  CT of the lumbar spine with bilateral L5 transverse process fractures which are displaced and bilateral complex comminuted fractures of the sacrum.  CT of the pelvis revealed comminuted fracture of the sacrum with anterolisthesis of S1 on S2 measuring 9.4 mm, subtitle fracture of the pubic symphysis of the right side inferiorly and transverse process fracture at L5 on the right and left side.  In the ED patient received methocarbamol, morphine and Zofran.  She is admitted to hospital medicine services for further medical management. Neurosurgery on-board; plan for surgical intervention on 09/27.  Orthopedics was consulted; recommending no surgery from their end.  PT/OT on board.  Underwent L4-S2 decompression and fusion 9/27.    Interval Hx:   Today, patient stated she was feeling better and had no new complaints.  Continues to have MELISSA drain; will follow Neurosurgery recommendations regarding removal.  PT/OT signing off with recommendations to DC patient home with assistance from .  Will plan for DC  once MELISSA drain is out and neurosurgery clears patient.    Case was discussed with patient's nurse on the floor.    VITAL SIGNS: 24 HRS MIN & MAX LAST   Temp  Min: 98.4 °F (36.9 °C)  Max: 99.5 °F (37.5 °C) 98.5 °F (36.9 °C)   BP  Min: 105/61  Max: 127/64 123/60   Pulse  Min: 77  Max: 90  87   Resp  Min: 16  Max: 18 16   SpO2  Min: 96 %  Max: 98 % 96 %     I have reviewed the following labs:  Recent Labs   Lab 09/25/24  0408 09/26/24  0339 09/28/24  0523   WBC 9.05 7.85 10.78   RBC 3.83* 4.06* 3.31*   HGB 11.5* 12.2 9.8*   HCT 34.8* 38.2 30.2*   MCV 90.9 94.1* 91.2   MCH 30.0 30.0 29.6   MCHC 33.0 31.9* 32.5*   RDW 12.2 12.1 12.3   * 478* 393   MPV 8.4 8.3 8.3     Recent Labs   Lab 09/24/24  1055 09/25/24  0337 09/26/24  0339 09/27/24  1843 09/28/24  0523   * 131* 135* 136 135*   K 3.8 4.0 4.4 4.2 3.8   CL 98 99 101 103 102   CO2 24 25 28 25 27   BUN 14.6 13.4 10.8 11.8 12.6   CREATININE 0.67 0.65 0.68 0.78 0.76   CALCIUM 9.2 8.7 8.9 8.9 8.6   MG 2.20  --  2.20  --   --    ALBUMIN 3.5 3.2*  --  3.9  --    ALKPHOS 190* 172*  --  141  --    ALT 22 20  --  21  --    AST 27 22  --  24  --    BILITOT 0.4 0.3  --  0.3  --      Assessment/Plan:  Bilateral L5 transverse process fracture  Bilateral comminuted fracture of the sacrum   Anterolisthesis of S1 on S2  Right inferior pubic symphysis fracture    Continues to be admitted   No new complaints  Neurosurgery on board; performed L4-S2 decompression and fusion 9/27  MELISSA drain remains in place  PT/OT on board; will follow recommendations to DC home  Tolerating p.o. intake and having bowel movements  Continued on Buspirone 5 mg b.i.d., gabapentin 300 mg b.i.d., levothyroxine 88 mcg, liothyronine 5 mcg  P.r.n. Robaxin t.i.d., p.r.n. morphine 3 mg q.6 ordered  Case management to be consulted for setting up of HH  Will plan for DC once cleared by Neurosurgery    VTE prophylaxis: SCDs    Patient condition:  StablE    Anticipated discharge and Disposition:      Pending    All diagnosis and differential diagnosis have been reviewed; assessment and plan has been documented; I have personally reviewed the labs and test results that are presently available; I have reviewed the patients medication list; I have reviewed the consulting providers response and recommendations. I have reviewed or attempted to review medical records based upon their availability    All of the patient's questions have been  addressed and answered. Patient's is agreeable to the above stated plan. I will continue to monitor closely and make adjustments to medical management as needed.    Portions of this note dictated using EMR integrated voice recognition software, and may be subject to voice recognition errors not corrected at proofreading. Please contact writer for clarification if needed.   _____________________________________________________________________    Radiology:  I have personally reviewed the following imaging and agree with the radiologist.     SURG FL Surgery Fluoro Usage  See OP Notes for results.     IMPRESSION: See OP Notes for results.     This procedure was auto-finalized by: Virtual Radiologist      Ollie Munoz MD  Department of Hospital Medicine   [facility]   09/29/2024

## 2024-09-29 NOTE — PLAN OF CARE
Problem: Adult Inpatient Plan of Care  Goal: Plan of Care Review  Outcome: Progressing  Flowsheets (Taken 9/29/2024 4908)  Plan of Care Reviewed With: patient

## 2024-09-29 NOTE — PLAN OF CARE
Problem: Adult Inpatient Plan of Care  Goal: Plan of Care Review  9/29/2024 1359 by Zandra Vora RN  Outcome: Progressing  Flowsheets (Taken 9/29/2024 1359)  Plan of Care Reviewed With: patient  9/29/2024 1358 by Zandra Vora RN  Outcome: Progressing  Goal: Patient-Specific Goal (Individualized)  9/29/2024 1359 by Zandra oVra RN  Outcome: Progressing  Flowsheets (Taken 9/29/2024 1359)  Individualized Care Needs: to go home  Anxieties, Fears or Concerns: hospitalzation  Patient/Family-Specific Goals (Include Timeframe): to go home  9/29/2024 1358 by Zandra Vora RN  Outcome: Progressing  Goal: Absence of Hospital-Acquired Illness or Injury  9/29/2024 1359 by Zandra Vora RN  Outcome: Progressing  9/29/2024 1358 by Zandra Vora RN  Outcome: Progressing  Intervention: Prevent Skin Injury  Flowsheets (Taken 9/29/2024 1359)  Body Position: position changed independently  Goal: Optimal Comfort and Wellbeing  9/29/2024 1359 by Zandra Vora RN  Outcome: Progressing  9/29/2024 1358 by Zandra Vora RN  Outcome: Progressing  Goal: Readiness for Transition of Care  9/29/2024 1359 by Zandra Vora RN  Outcome: Progressing  9/29/2024 1358 by Zandra Vora RN  Outcome: Progressing

## 2024-09-29 NOTE — NURSING
Nurses Note -- 4 Eyes      9/29/2024   0715      Skin assessed during: Q Shift Change      [] No Altered Skin Integrity Present    []Prevention Measures Documented      [x] Yes- Altered Skin Integrity- surgicial incision Persists   [] LDA Added if Not in Epic (Describe Wound)   [] New Altered Skin Integrity was Present on Admit and Documented in LDA   [] Wound Image Taken    Wound Care Consulted? No    Attending Nurse:  Zandra Rodriguez RN/Staff Member:   RN

## 2024-09-29 NOTE — NURSING
Nurses Note -- 4 Eyes      9/29/2024   2:59 AM      Skin assessed during: Q Shift Change      [x] No Altered Skin Integrity Present    [x]Prevention Measures Documented      [] Yes- Altered Skin Integrity Present or Discovered   [] LDA Added if Not in Epic (Describe Wound)   [] New Altered Skin Integrity was Present on Admit and Documented in LDA   [] Wound Image Taken    Wound Care Consulted? No    Attending Nurse:  Kirsten Rodriguez RN/Staff Member:  CLARIBEL Miller

## 2024-09-29 NOTE — PROGRESS NOTES
[facility] - Periop Services  Neurosurgery  Progress Note    Subjective:     Interval History:     Postop day 2. Status post posterior L4-S2 decompression and fusion    Patient is sitting up in bed eating   Much improved and ambulating.    She is obsessing over her bowels and bladder which both are functioning at this time.    She is concerned about the numbness to her vaginal and rectal area.  She was counseled on this by myself and hospitalist at the bedside numerous times and told to give it some time.    She is also on gabapentin and would like to go home on this.    She is prepared to discharge home tomorrow if possible   She continues with a MELISSA drain that put out approximately 170 mL serosanguineous in the last 24 hours.  This was placed to thumb print.    Her surgical incision was personally inspected and is clean and dry.          Post-Op Info:  Procedure(s) (LRB):  FUSION, SPINE, LUMBAR, PLIF, USING COMPUTER-ASSISTED NAVIGATION (N/A)   2 Days Post-Op      Medications:  Continuous Infusions:      Scheduled Meds:   bisacodyL  10 mg Rectal Daily    busPIRone  5 mg Oral BID    gabapentin  300 mg Oral BID    levothyroxine  88 mcg Oral Before breakfast    liothyronine  5 mcg Oral Before breakfast    mupirocin   Nasal BID    senna-docusate 8.6-50 mg  2 tablet Oral BID     PRN Meds:  Current Facility-Administered Medications:     acetaminophen, 650 mg, Oral, Q8H PRN    acetaminophen, 650 mg, Oral, Q4H PRN    aluminum-magnesium hydroxide-simethicone, 30 mL, Oral, Q4H PRN    calcium carbonate, 500 mg, Oral, Daily PRN    dextrose 10%, 12.5 g, Intravenous, PRN    dextrose 10%, 25 g, Intravenous, PRN    diphenhydrAMINE, 25 mg, Intravenous, Q6H PRN    glucagon (human recombinant), 1 mg, Intramuscular, PRN    glucose, 16 g, Oral, PRN    glucose, 24 g, Oral, PRN    HYDROcodone-acetaminophen, 1 tablet, Oral, Q4H PRN    HYDROcodone-acetaminophen, 1 tablet, Oral, Q4H PRN    lorazepam, 2 mg, Intravenous, Once  PRN    melatonin, 6 mg, Oral, Nightly PRN    methocarbamoL, 500 mg, Oral, TID PRN    morphine, 1 mg, Intravenous, Q1H PRN    morphine, 2 mg, Intravenous, Q1H PRN    morphine, 4 mg, Intravenous, Q3H PRN    ondansetron, 4 mg, Intravenous, Q4H PRN    oxyCODONE-acetaminophen, 1 tablet, Oral, Q4H PRN    prochlorperazine, 10 mg, Intravenous, Q6H PRN    sodium chloride 0.9%, 10 mL, Intravenous, Q12H PRN     Review of Systems  Objective:     Weight: 46.4 kg (102 lb 4.8 oz)  Body mass index is 18.12 kg/m².  Vital Signs (Most Recent):  Temp: 98.2 °F (36.8 °C) (09/29/24 1223)  Pulse: 86 (09/29/24 1223)  Resp: 20 (09/29/24 1131)  BP: 120/64 (09/29/24 1223)  SpO2: 96 % (09/29/24 1223) Vital Signs (24h Range):  Temp:  [98.2 °F (36.8 °C)-99.5 °F (37.5 °C)] 98.2 °F (36.8 °C)  Pulse:  [77-90] 86  Resp:  [16-20] 20  SpO2:  [96 %-98 %] 96 %  BP: (105-127)/(58-65) 120/64                                Closed/Suction Drain 09/27/24 Inferior;Medial Back Bulb 10 Fr. (Active)   Dressing Type Gauze;Other (Comment) 09/27/24 1316   Dressing Status New drainage;Dry;Intact 09/27/24 1316   Dressing Intervention First dressing 09/27/24 1316   Drainage Serosanguineous 09/27/24 1316   Status To bulb suction 09/27/24 1316   Output (mL) 50 mL 09/27/24 1502            Urethral Catheter 09/27/24 1053 Silicone 16 Fr. (Active)   Site Assessment Clean;Intact;Dry 09/27/24 1316   Collection Container Urimeter 09/27/24 1316   Securement Method secured to top of thigh w/ adhesive device 09/27/24 1316       Neurosurgery Physical Exam    GCS 15   Awake and conversive   Moving all extremities equally and strong 5/5 to upper and lower extremity muscle groups with some guarding to lower extremities.    Some vaginal numbness reported  Some numbness to bilateral posterior thighs    Significant Labs:  Recent Labs   Lab 09/27/24  1843 09/28/24  0523    135*   K 4.2 3.8    102   CO2 25 27   BUN 11.8 12.6   CREATININE 0.78 0.76   CALCIUM 8.9 8.6     Recent  "Labs   Lab 09/28/24  0523   WBC 10.78   HGB 9.8*   HCT 30.2*        No results for input(s): "LABPT", "INR", "APTT" in the last 48 hours.  Microbiology Results (last 7 days)       ** No results found for the last 168 hours. **              Assessment/Plan:    Continue PT/OT   Case management for home assistance   Continue multimodal pain control   Continue MELISSA at thumb print  Bowel regimen   Fall precautions   SCDs     Both myself and hospitalist spent an extensive amount of time counseling patient on expectations postoperatively.  Her MELISSA was placed a thumb print.  She is preparing to go home tomorrow.  She would like 1 more physical therapy session tomorrow.  She would also like a prescription for gabapentin tomorrow.       Active Diagnoses:    Diagnosis Date Noted POA    PRINCIPAL PROBLEM:  Closed minimally displaced zone III fracture of sacrum [S32.131A] 09/25/2024 Yes    Moderate malnutrition [E44.0] 09/25/2024 Yes      Problems Resolved During this Admission:       Yehuda Brooke [facility]  Neurosurgery  [facility] Services    "

## 2024-09-30 VITALS
DIASTOLIC BLOOD PRESSURE: 52 MMHG | WEIGHT: 102.31 LBS | BODY MASS INDEX: 18.13 KG/M2 | OXYGEN SATURATION: 96 % | HEIGHT: 63 IN | SYSTOLIC BLOOD PRESSURE: 103 MMHG | RESPIRATION RATE: 18 BRPM | HEART RATE: 89 BPM | TEMPERATURE: 99 F

## 2024-09-30 LAB
ANION GAP SERPL CALC-SCNC: 8 MEQ/L
BUN SERPL-MCNC: 11.9 MG/DL (ref 9.8–20.1)
CALCIUM SERPL-MCNC: 9.1 MG/DL (ref 8.4–10.2)
CHLORIDE SERPL-SCNC: 100 MMOL/L (ref 98–107)
CO2 SERPL-SCNC: 26 MMOL/L (ref 23–31)
CREAT SERPL-MCNC: 0.71 MG/DL (ref 0.55–1.02)
CREAT/UREA NIT SERPL: 17
GFR SERPLBLD CREATININE-BSD FMLA CKD-EPI: >60 ML/MIN/1.73/M2
GLUCOSE SERPL-MCNC: 102 MG/DL (ref 82–115)
POTASSIUM SERPL-SCNC: 3.5 MMOL/L (ref 3.5–5.1)
SODIUM SERPL-SCNC: 134 MMOL/L (ref 136–145)

## 2024-09-30 PROCEDURE — 25000003 PHARM REV CODE 250: Performed by: NURSE PRACTITIONER

## 2024-09-30 PROCEDURE — 25000003 PHARM REV CODE 250: Performed by: STUDENT IN AN ORGANIZED HEALTH CARE EDUCATION/TRAINING PROGRAM

## 2024-09-30 PROCEDURE — 36415 COLL VENOUS BLD VENIPUNCTURE: CPT | Performed by: STUDENT IN AN ORGANIZED HEALTH CARE EDUCATION/TRAINING PROGRAM

## 2024-09-30 PROCEDURE — 25000003 PHARM REV CODE 250

## 2024-09-30 PROCEDURE — 80048 BASIC METABOLIC PNL TOTAL CA: CPT | Performed by: STUDENT IN AN ORGANIZED HEALTH CARE EDUCATION/TRAINING PROGRAM

## 2024-09-30 RX ORDER — AMOXICILLIN 250 MG
2 CAPSULE ORAL 2 TIMES DAILY
Qty: 60 TABLET | Refills: 3 | Status: SHIPPED | OUTPATIENT
Start: 2024-09-30

## 2024-09-30 RX ORDER — GABAPENTIN 300 MG/1
300 CAPSULE ORAL 2 TIMES DAILY
Qty: 60 CAPSULE | Refills: 11 | Status: SHIPPED | OUTPATIENT
Start: 2024-09-30 | End: 2025-09-30

## 2024-09-30 RX ORDER — METHOCARBAMOL 500 MG/1
500 TABLET, FILM COATED ORAL 3 TIMES DAILY PRN
Qty: 30 TABLET | Refills: 0 | Status: SHIPPED | OUTPATIENT
Start: 2024-09-30 | End: 2024-10-10

## 2024-09-30 RX ORDER — BISACODYL 10 MG/1
10 SUPPOSITORY RECTAL DAILY
Qty: 30 SUPPOSITORY | Refills: 0 | Status: SHIPPED | OUTPATIENT
Start: 2024-10-01 | End: 2024-09-30

## 2024-09-30 RX ORDER — BISACODYL 10 MG/1
10 SUPPOSITORY RECTAL DAILY
Qty: 30 SUPPOSITORY | Refills: 0 | Status: SHIPPED | OUTPATIENT
Start: 2024-10-01

## 2024-09-30 RX ORDER — GABAPENTIN 300 MG/1
300 CAPSULE ORAL 2 TIMES DAILY
Qty: 60 CAPSULE | Refills: 11 | Status: SHIPPED | OUTPATIENT
Start: 2024-09-30 | End: 2024-09-30

## 2024-09-30 RX ORDER — OXYCODONE AND ACETAMINOPHEN 10; 325 MG/1; MG/1
1 TABLET ORAL EVERY 4 HOURS PRN
Qty: 12 TABLET | Refills: 0 | Status: SHIPPED | OUTPATIENT
Start: 2024-09-30

## 2024-09-30 RX ADMIN — GABAPENTIN 300 MG: 300 CAPSULE ORAL at 03:09

## 2024-09-30 RX ADMIN — METHOCARBAMOL 500 MG: 500 TABLET ORAL at 02:09

## 2024-09-30 RX ADMIN — GABAPENTIN 300 MG: 300 CAPSULE ORAL at 04:09

## 2024-09-30 RX ADMIN — OXYCODONE AND ACETAMINOPHEN 1 TABLET: 10; 325 TABLET ORAL at 07:09

## 2024-09-30 RX ADMIN — LIOTHYRONINE SODIUM 5 MCG: 5 TABLET ORAL at 06:09

## 2024-09-30 RX ADMIN — LEVOTHYROXINE SODIUM 88 MCG: 88 TABLET ORAL at 06:09

## 2024-09-30 RX ADMIN — BUSPIRONE HYDROCHLORIDE 5 MG: 5 TABLET ORAL at 08:09

## 2024-09-30 RX ADMIN — OXYCODONE AND ACETAMINOPHEN 1 TABLET: 10; 325 TABLET ORAL at 12:09

## 2024-09-30 NOTE — PLAN OF CARE
Met with pt and spouse at bedside to discuss HH services and provide list of agencies. Pt stated they would call/text me once they made decision on agency. Still pending at this time.    Betina Nelson, LCSW    [phone] Referral sent to [facility], per pt/spouse request.

## 2024-09-30 NOTE — OP NOTE
[facility]                       [address] [place] [id number]    PATIENT NAME:      LIZETH SANABRIA   YOB: 1955  CSN:               [id number]  MRN:               [id number]  ADMIT DATE:        09/24/2024 09:56:00  PHYSICIAN:         Luis Carlos Acosta MD                          OPERATIVE REPORT      DATE OF SURGERY:    09/27/2024 00:00:00    SURGEON:  Luis Carlos Acosta MD    ASSISTANT:  Armani Klein.    PREOPERATIVE DIAGNOSES:  S1-S2 fracture destruction with urinary issues, bowel   issues, stenosis at L4-L5 with severe back pain with urinary and bowel issues.    POSTOPERATIVE DIAGNOSES:  S1-S2 fracture destruction with urinary issues, bowel   issues, stenosis at L4-L5 with severe back pain with urinary and bowel issues.    PROCEDURES:  Open exposure, removal of pressure on nerve root thecal sac at   L4-L5, L5-S1 going to S1 and removal of  broken bony spicule and laminectomy all   the way from 4 down to S2 with complete removal of pressure on nerve root and   thecal sac.  Subsequent fusion with pedicle screws at L4, L5, and S2, brought on   the cast,  put sizer, roughed up, decorticated and packed with OsteoAMP, DBM,   autograft, and allograft.  We also put DuraGen and fibrin glue over the thecal   sac.  We left a drain.    FINDINGS:  Severe narrowing in S1-S2 and also L4-L5, especially at S1-S2 with   some bony spicules putting pressure into the dura.  With fracture microscope was   used and monitoring was used.    REASON FOR SURGERY:  The patient is a 68-year-old with severe fracture of S1-S2,   here for decompression and removal of pressure on nerve and thecal sac.    Consent and risks were discussed, risks of bleeding, infection, weakness, prior   CSF leak, reoperation, hemorrhage were discussed in detail.  Again, they want me   to proceed with surgery.  We spent some time with all the risks and benefits.    Risks of  bleeding, infection, weakness, CSF leak, reoperation.  They wanted me   to proceed with surgery.  We spent discussing on this and had multiple questions   and talked to them with a fractured S1 causing severe pressure with urine and   bowel issues, talked about decompressing that area in addition to coming to 4-5   and putting screws.  All the risk of bleeding, infection, weakness, prior CSF   leak, reoperation discussed in detail, then we proceeded with surgery.    OPERATIVE PROCEDURE:  The patient was brought to the operating room, intubated,   Carvajal placement, monitoring was attached.  Back was prepped and draped over the   incision, L4, L5, S1, S2.  Put retractors, identified the levels.  Once that was   done, we did an O-arm spin and put screws at L4-L5 and we put the iliac bone to   this too.  We did appropriate trajectory and appropriate tapping, and putting   the screw, the screw larger S2.  Once the screws were laid at L4, L5, and S2, we   then sized macro decompressing the L4, L5, S1, S2.  Going further down was very   tight especially at 4-5 and then as we went towards the fracture was there was   jagging of the thecal sac and angulation requiring gradual dissection and biting   of the bone and teasing off using 1 mm tool guide and hooks.  Until we could go   to the fragment crossing both sides and as I would go around the right side was   also removed, pressure was taken off the nerve root and thecal sac, especially   the thecal sac as we go lower down.  At one point, we could see there was little   opening where the bone was removed and we put a stitch there, otherwise there   were no issues at all.  Wound was then irrigated multiple times.  Once we had   done all the opening ensured foraminotomy was done, we then went on each side   after the transposing joint and packed with autograft, allograft.  Once that was   done, bars were put on top of the screw heads and final tightening was done.    Once it  was nice and tight, doing multiple times throughout the case.  We put   DuraGen fibrin glue, put a drain, and closed the wound with multiple layers of   0-Vicryl, 0 Vicryl, and running subcu.    TIME SPENT:  I spent some time discussing the care with the family and the   , and he understood the situation .        ______________________________  MD SALENA Melgar/MARCIO  DD:  09/27/2024  Time:  01:03PM  DT:  09/27/2024  Time:  02:38PM  Job #:  [id number]      OPERATIVE REPORT

## 2024-09-30 NOTE — NURSING
Nurses Note -- 4 Eyes      9/29/2024   7:17 PM      Skin assessed during: Q Shift Change      [x] No Altered Skin Integrity Present    []Prevention Measures Documented      [] Yes- Altered Skin Integrity Present or Discovered   [] LDA Added if Not in Epic (Describe Wound)   [] New Altered Skin Integrity was Present on Admit and Documented in LDA   [] Wound Image Taken    Wound Care Consulted? No    Attending Nurse:  Charmaine Rodriguez RN/Staff Member:   Zandra GREEN

## 2024-09-30 NOTE — PROGRESS NOTES
Inpatient Nutrition Assessment    Admit Date: 9/24/2024   Total duration of encounter: 6 days   Patient Age: 68 y.o.    Nutrition Recommendation/Prescription     Continue Regular diet.   Encouraged adequate PO intake  Monitor appetite/PO intake, weight, and labs    Communication of Recommendations: reviewed with patient and reviewed with family    Nutrition Assessment     Chart Review    Reason Seen: continuous nutrition monitoring BMI <18.5 kg/m^2    Malnutrition Screening Tool Results   Have you recently lost weight without trying?: Yes: 2-13 lbs  Have you been eating poorly because of a decreased appetite?: No   MST Score: 1   Diagnosis:  Bilateral L5 transverse process fracture  Bilateral comminuted fracture of the sacrum   Anterolisthesis of S1 on S2  Right inferior pubic symphysis fracture  Elevated alkaline phosphatase   Hyponatremia    Relevant Medical History:    Hypercholesteremia      Hypothyroidism, unspecified      Migraine      Palpitations      Trigger thumb, right thumb        Scheduled Medications:  bisacodyL, 10 mg, Daily  busPIRone, 5 mg, BID  gabapentin, 300 mg, BID  levothyroxine, 88 mcg, Before breakfast  liothyronine, 5 mcg, Before breakfast  senna-docusate 8.6-50 mg, 2 tablet, BID    Continuous Infusions:     PRN Medications:  acetaminophen, 650 mg, Q8H PRN  acetaminophen, 650 mg, Q4H PRN  aluminum-magnesium hydroxide-simethicone, 30 mL, Q4H PRN  calcium carbonate, 500 mg, Daily PRN  dextrose 10%, 12.5 g, PRN  dextrose 10%, 25 g, PRN  diphenhydrAMINE, 25 mg, Q6H PRN  glucagon (human recombinant), 1 mg, PRN  glucose, 16 g, PRN  glucose, 24 g, PRN  HYDROcodone-acetaminophen, 1 tablet, Q4H PRN  HYDROcodone-acetaminophen, 1 tablet, Q4H PRN  lorazepam, 2 mg, Once PRN  melatonin, 6 mg, Nightly PRN  methocarbamoL, 500 mg, TID PRN  morphine, 1 mg, Q1H PRN  morphine, 2 mg, Q1H PRN  morphine, 4 mg, Q3H PRN  ondansetron, 4 mg, Q4H PRN  oxyCODONE-acetaminophen, 1 tablet, Q4H PRN  prochlorperazine, 10 mg,  "Q6H PRN  sodium chloride 0.9%, 10 mL, Q12H PRN    Calorie Containing IV Medications: no significant kcals from medications at this time    Recent Labs   Lab 09/24/24  1055 09/25/24  0337 09/25/24  0408 09/26/24  0339 09/27/24  1843 09/28/24  0523 09/30/24  1053   * 131*  --  135* 136 135* 134*   K 3.8 4.0  --  4.4 4.2 3.8 3.5   CALCIUM 9.2 8.7  --  8.9 8.9 8.6 9.1   PHOS  --   --   --  3.4  --   --   --    MG 2.20  --   --  2.20  --   --   --    CL 98 99  --  101 103 102 100   CO2 24 25  --  28 25 27 26   BUN 14.6 13.4  --  10.8 11.8 12.6 11.9   CREATININE 0.67 0.65  --  0.68 0.78 0.76 0.71   EGFRNORACEVR >60 >60  --  >60 >60 >60 >60   GLUCOSE 97 89  --  88 171* 103 102   BILITOT 0.4 0.3  --   --  0.3  --   --    ALKPHOS 190* 172*  --   --  141  --   --    ALT 22 20  --   --  21  --   --    AST 27 22  --   --  24  --   --    ALBUMIN 3.5 3.2*  --   --  3.9  --   --    WBC 9.28  --  9.05 7.85  --  10.78  --    HGB 12.9  --  11.5* 12.2  --  9.8*  --    HCT 39.2  --  34.8* 38.2  --  30.2*  --      Nutrition Orders:  Diet Adult Regular      Appetite/Oral Intake: good/% of meals  Factors Affecting Nutritional Intake: none identified  Social Needs Impacting Access to Food: none identified  Food/Yazidism/Cultural Preferences:  no milk products  Food Allergies: none reported  Last Bowel Movement: 09/28/24  Wound(s):  none noted    Comments    9/25/2024: Pt reports a good appetite/PO intake prior to admit. Pt NPO at time of visit. Pt denies N/V/D/C and chewing/swallowing difficulties. Pt reports wt loss. No pert wt hx per EMR. Pt declined ONS, agreeable to double portions. Last BM noted. Encouraged adequate PO intake. Will monitor.    9/30/2024: Patient reports good oral intake of meals served. Denies any concerns at this time. Anticipate discharge later today.     Anthropometrics    Height: 5' 3" (160 cm), Height Method: Stated  Last Weight: 46.4 kg (102 lb 4.8 oz) (09/24/24 2030), Weight Method: Bed Scale  BMI " (Calculated): 18.1  BMI Classification: underweight (BMI less than 18.5)     Ideal Body Weight (IBW), Female: 115 lb     % Ideal Body Weight, Female (lb): 88.96 %                             Usual Weight Provided By: unable to obtain usual weight    Wt Readings from Last 5 Encounters:   09/24/24 46.4 kg (102 lb 4.8 oz)   02/08/21 51.6 kg (113 lb 12.1 oz)     Weight Change(s) Since Admission:   9/24/2024: 46.4 kg  Wt Readings from Last 1 Encounters:   09/24/24 2030 46.4 kg (102 lb 4.8 oz)   09/24/24 0951 49.9 kg (110 lb)   Admit Weight: 49.9 kg (110 lb) (09/24/24 0951), Weight Method: Stated    Estimated Needs    Weight Used For Calorie Calculations: 46.4 kg (102 lb 4.7 oz)  Energy Calorie Requirements (kcal): 8250-3912 (30-35 kcal/kg)     Weight Used For Protein Calculations: 46.4 kg (102 lb 4.7 oz)  Protein Requirements: 55-69 (1.2-1.5 g/kg)  Fluid Requirements (mL): 1392 (1 mL/kcal)  CHO Requirement: 156-191 g (45-55% est min kcal needs)     Enteral Nutrition     Patient not receiving enteral nutrition at this time.    Parenteral Nutrition     Patient not receiving parenteral nutrition support at this time.    Evaluation of Received Nutrient Intake    Calories: meeting estimated needs  Protein: meeting estimated needs    Patient Education     Not applicable.      Nutrition Goals & Monitoring     Dietitian will monitor: food and beverage intake, weight, electrolyte/renal panel, glucose/endocrine profile, and gastrointestinal profile  Discharge planning: resume home regimen  Nutrition Risk/Follow-Up: low (follow-up in 5-7 days)   Please consult if re-assessment needed sooner.

## 2024-09-30 NOTE — PLAN OF CARE
Problem: Adult Inpatient Plan of Care  Goal: Plan of Care Review  Outcome: Progressing  Goal: Patient-Specific Goal (Individualized)  Outcome: Progressing  Goal: Absence of Hospital-Acquired Illness or Injury  Outcome: Progressing  Goal: Optimal Comfort and Wellbeing  Outcome: Progressing  Goal: Readiness for Transition of Care  Outcome: Progressing     Problem: Fall Injury Risk  Goal: Absence of Fall and Fall-Related Injury  Outcome: Progressing     Problem: Pain Acute  Goal: Optimal Pain Control and Function  Outcome: Progressing     Problem: Anxiety  Goal: Anxiety Reduction or Resolution  Outcome: Progressing     Problem: Infection  Goal: Absence of Infection Signs and Symptoms  Outcome: Progressing     Problem: Wound  Goal: Optimal Coping  Outcome: Progressing  Goal: Optimal Functional Ability  Outcome: Progressing  Goal: Absence of Infection Signs and Symptoms  Outcome: Progressing  Goal: Improved Oral Intake  Outcome: Progressing  Goal: Optimal Pain Control and Function  Outcome: Progressing  Goal: Skin Health and Integrity  Outcome: Progressing  Goal: Optimal Wound Healing  Outcome: Progressing     Problem: Skin Injury Risk Increased  Goal: Skin Health and Integrity  Outcome: Progressing

## 2024-09-30 NOTE — NURSING
Nurses Note -- 4 Eyes      9/30/2024   8:01 AM      Skin assessed during: Q Shift Change      [x] No Altered Skin Integrity Present    [x]Prevention Measures Documented      [] Yes- Altered Skin Integrity Present or Discovered   [] LDA Added if Not in Epic (Describe Wound)   [] New Altered Skin Integrity was Present on Admit and Documented in LDA   [] Wound Image Taken    Wound Care Consulted? No    Attending Nurse:  Rand Rodriguez RN/Staff Member:   Charmaine

## 2024-10-01 ENCOUNTER — PATIENT OUTREACH (OUTPATIENT)
Dept: ADMINISTRATIVE | Facility: CLINIC | Age: 69
End: 2024-10-01
Payer: COMMERCIAL

## 2024-10-01 NOTE — PROGRESS NOTES
C3 nurse spoke with Su Greenfield for a TCC post hospital discharge follow up call. The patient has a scheduled [facility] appointment with Luis Carlos Acosta MD on 10/22/2024; at 9 am.

## 2024-10-01 NOTE — PLAN OF CARE
Pt denied by [facility]. Contacted spouse to update. Requested referral be sent to [facility] . If they cannot accept, requested referral be sent to [facility] for [facility]. Referral sent to [facility].     Betina Nelson, ANDREW    1030 Accepted by [facility] .

## 2024-10-02 NOTE — ANESTHESIA POSTPROCEDURE EVALUATION
Anesthesia Post Evaluation    Patient: Su Hall    Procedure(s) Performed: Procedure(s) (LRB):  FUSION, SPINE, LUMBAR, PLIF, USING COMPUTER-ASSISTED NAVIGATION (N/A)    Final Anesthesia Type: general      Patient location during evaluation: PACU  Patient participation: Yes- Able to Participate  Level of consciousness: awake and alert  Post-procedure vital signs: reviewed and stable  Pain management: adequate  Airway patency: patent    PONV status at discharge: No PONV  Anesthetic complications: no      Respiratory status: unassisted  Hydration status: euvolemic  Follow-up not needed.              Vitals Value Taken Time   /69 09/27/24 1631   Temp 36.5 °C (97.7 °F) 09/27/24 1318   Pulse 80 09/27/24 1633   Resp 21 09/27/24 1633   SpO2 94 % 09/27/24 1633   Vitals shown include unfiled device data.      Event Time   Out of Recovery 09/27/2024 16:35:00         Pain/Briana Score: No data recorded

## 2024-10-03 ENCOUNTER — HOSPITAL ENCOUNTER (EMERGENCY)
Facility: HOSPITAL | Age: 69
Discharge: HOME | End: 2024-10-03
Payer: COMMERCIAL

## 2024-10-03 VITALS
TEMPERATURE: 99 F | RESPIRATION RATE: 20 BRPM | HEIGHT: 63 IN | HEART RATE: 120 BPM | BODY MASS INDEX: 19.49 KG/M2 | OXYGEN SATURATION: 97 % | WEIGHT: 110 LBS | SYSTOLIC BLOOD PRESSURE: 124 MMHG | DIASTOLIC BLOOD PRESSURE: 71 MMHG

## 2024-10-03 PROCEDURE — 99999 HC NO LEVEL OF SERVICE - ED ONLY: CPT

## 2024-10-03 RX ORDER — HYDROCODONE BITARTRATE AND ACETAMINOPHEN 10; 325 MG/1; MG/1
1 TABLET ORAL
Status: DISCONTINUED | OUTPATIENT
Start: 2024-10-03 | End: 2024-10-04 | Stop reason: HOSPADM

## 2024-10-07 NOTE — DISCHARGE SUMMARY
[facility] Hospital Medicine Discharge Summary    Admit Date: 9/24/2024  Discharge Date and Time: 09/30/2024  Admitting Physician:  Team  Discharging Physician: Ollie Munoz MD.  Primary Care Physician: Alessia Boston MD  Consults: Hospital Medicine    Discharge Diagnoses:  Bilateral L5 transverse process fracture  Bilateral comminuted fracture of the sacrum   Anterolisthesis of S1 on S2  Right inferior pubic symphysis fracture    Hospital Course:   68 y.o. White female with a past medical history of hypertension, hyperlipidemia, anxiety/depression, hypothyroidism. The patient presented to [facility] on 9/24/2024 with a primary complaint of bilateral hip pain for the last month with intermittent numbness down the legs bilaterally.     Patient has been doing physical therapy for the last 3 months due to a pulled Plavix floor muscle.  She has been using a walker to ambulate but now is having difficulty ambulating with the walker.     Upon presentation to the ED, temperature 98.3° F, heart rate 87, blood pressure 147/83, respiratory rate 20 and SpO2 99% on room air.  Labs with sodium 132, alkaline phosphatase 190, , troponin less than 0.01, TSH 5.2, alcohol level less than 10.  CT of the head with no acute intracranial findings.  CT of the lumbar spine with bilateral L5 transverse process fractures which are displaced and bilateral complex comminuted fractures of the sacrum.  CT of the pelvis revealed comminuted fracture of the sacrum with anterolisthesis of S1 on S2 measuring 9.4 mm, subtitle fracture of the pubic symphysis of the right side inferiorly and transverse process fracture at L5 on the right and left side.  In the ED patient received methocarbamol, morphine and Zofran.  She is admitted to hospital medicine services for further medical management. Neurosurgery on-board; plan for surgical intervention on 09/27.  Orthopedics was consulted; recommending no surgery from  "their end.  PT/OT on board.  Underwent L4-S2 decompression and fusion 9/27. PT/OT signing off with recommendations to DC patient home with assistance from . MELISSA drain removed by NSX.    Pt was seen and examined on the day of discharge. She stated she was feeling better and had no new complaints. Plan for DC with HH.    Vitals:  VITAL SIGNS: 24 HRS MIN & MAX LAST   No data recorded 98.9 °F (37.2 °C)   No data recorded (!) 103/52   No data recorded  89   No data recorded 18   No data recorded 96 %       Physical Exam:  General: alert lady lying comfortably in bed, in no acute distress  HENT: oral and oropharyngeal mucosa moist, pink, with no erythema or exudates, no ear pain or discharge  Neck: normal neck movement, no lymph nodes or swellings, no JVD or Carotid bruit  Respiratory: clear breathing sounds bilaterally, no crackles, rales, ronchi or wheezes  Cardiovascular: clear S1 and S2, no murmurs, rubs or gallops  Peripheral Vascular: no lesions, ulcers or erosions, normal peripheral pulses and no pedal edema  Gastrointestinal: soft, non-tender, non-distended abdomen, no guarding, rigidity or rebound tenderness, normal bowel sounds  Integumentary: normal skin color, no rashes or lesions  Neuro: AAO x 3; motor strength 5/5 in B/L UEs & LEs; sensation intact to gross and fine touch B/L; CN II-XII grossly intact     Procedures Performed: No admission procedures for hospital encounter.     Significant Diagnostic Studies: See Full reports for all details    No results for input(s): "WBC", "RBC", "HGB", "HCT", "MCV", "MCH", "MCHC", "RDW", "PLT", "MPV", "GRAN", "LYMPH", "MONO", "BASO", "NRBC" in the last 168 hours.    Recent Labs   Lab 09/30/24  1053   *   K 3.5      CO2 26   BUN 11.9   CREATININE 0.71   CALCIUM 9.1        Microbiology Results (last 7 days)       ** No results found for the last 168 hours. **             CT Lumbar Spine Without Contrast  Narrative: EXAMINATION:  CT LUMBAR SPINE WITHOUT " CONTRAST    CLINICAL HISTORY:  s/p recent L4-S2 decompression/ fusion;    TECHNIQUE:  Low dose helical acquired images with axial, sagittal and coronal reformations though the lumbar spine.  Contrast was not administered.    All CT scans at this location are performed using dose optimization techniques as appropriate to a performed exam including the following automated exposure control, adjustment of the mA and/or kV according to patient size and/or use of iterative reconstruction technique    DLP: 385 mGycm    COMPARISON:  MRI lumbar spine 09/25/2024, CT pelvis 09/24/2024    FINDINGS:  NUMBERING: Last fully formed disc space is designated L5-S1.    BONES: There are postsurgical changes of L4-S2 spinal fusion and laminectomies.  Hardware appears engaged and intact.   Unchanged alignment of sacral fracture.    DISCS: Disc spaces are relatively well preserved.    SPINAL CANAL: Posterior decompression.    NEURAL FORAMINA: Buckling of the S2 fracture may contribute to some S1 nerve root impingement at S1-S2 neural foramen, similar to priors.    SOFT TISSUES: Regional soft tissues are unremarkable.  Impression: Interval posterior spinal fusion and laminectomies.  No appreciable hardware complication.    The preliminary and final reports are concordant.    Electronically signed by: Rosana Anguiano  Date:    10/04/2024  Time:    08:20         Medication List        START taking these medications      bisacodyL 10 mg Supp  Commonly known as: DULCOLAX  Place 1 suppository (10 mg total) rectally once daily.     gabapentin 300 MG capsule  Commonly known as: NEURONTIN  Take 1 capsule (300 mg total) by mouth 2 (two) times daily.     methocarbamoL 500 MG Tab  Commonly known as: ROBAXIN  Take 1 tablet (500 mg total) by mouth 3 (three) times daily as needed.     oxyCODONE-acetaminophen  mg per tablet  Commonly known as: PERCOCET  Take 1 tablet by mouth every 4 (four) hours as needed (pain 8-10/10).     senna-docusate 8.6-50  mg 8.6-50 mg per tablet  Commonly known as: PERICOLACE  Take 2 tablets by mouth 2 (two) times daily.            CONTINUE taking these medications      busPIRone 5 MG Tab  Commonly known as: BUSPAR     * UNITHROID 50 mcg tablet  Generic drug: levothyroxine     * levothyroxine 50 MCG tablet  Commonly known as: SYNTHROID           * This list has 2 medication(s) that are the same as other medications prescribed for you. Read the directions carefully, and ask your doctor or other care provider to review them with you.                   Where to Get Your Medications        These medications were sent to [facility] - [place], [place] - [address]  [address], [place] [address]      Phone: [phone]   bisacodyL 10 mg Supp  gabapentin 300 MG capsule       These medications were sent to [id number] - [place], [place] - [address] AT [address]  [address], [place] [address]      Phone: [phone]   methocarbamoL 500 MG Tab  senna-docusate 8.6-50 mg 8.6-50 mg per tablet       You can get these medications from any pharmacy    Bring a paper prescription for each of these medications  oxyCODONE-acetaminophen  mg per tablet          Explained in detail to the patient about the discharge plan, medications, and follow-up visits. Pt understands and agrees with the treatment plan  Discharge Disposition: Home or Self Care   Discharged Condition: stable  Diet-    Medications Per DC med rec  Activities as tolerated   Contact information for follow-up providers       Luis Carlos Acosta MD Follow up on 10/22/2024.    Specialty: Neurosurgery  Why: at 9 am  Contact information:  [address]  [place] [id number] [phone]                       Contact information for after-discharge care       Home Medical Care       [facility] .    Service: Home Health Services  Contact  information:  [address] [address]  [phone]                                 For further questions contact hospitalist office    Discharge time 33 minutes    For worsening symptoms, chest pain, shortness of breath, increased abdominal pain, high grade fever, stroke or stroke like symptoms, immediately go to the nearest Emergency Room or call 911 as soon as possible.      Ollie Birch M.D.

## 2024-10-09 NOTE — PHYSICIAN QUERY
Please clarify if there is any clinical correlation between fracture  and osteoporotic.. Are the conditions:   Due to or associated with each other

## (undated) DEVICE — Device

## (undated) DEVICE — DISH PETRI MED 3.5IN

## (undated) DEVICE — DRESSING XEROFORM 5X9IN

## (undated) DEVICE — STRIP MEDI WND CLSR 1/2X4IN

## (undated) DEVICE — ELECTRODE BLD EXT 6.50 ST DISP

## (undated) DEVICE — TRAY CATH FOL SIL URIMTR 16FR

## (undated) DEVICE — DRAPE SURG W/TWL 17 5/8X23

## (undated) DEVICE — SET SUCTION ANTICOAG .25IN

## (undated) DEVICE — COVER HD BACK TABLE 6FT

## (undated) DEVICE — TAPE SILK 3IN

## (undated) DEVICE — DRESSING TELFA N ADH 3X8

## (undated) DEVICE — GLOVE PROTEXIS NEU-THERA SZ6

## (undated) DEVICE — DRAPE TOP 53X102IN

## (undated) DEVICE — COVER TABLE HVY DTY 60X90IN

## (undated) DEVICE — KIT SURGIFLO HEMOSTATIC MATRIX

## (undated) DEVICE — STAPLER SKIN PROXIMATE WIDE

## (undated) DEVICE — DRAPE C-ARMOR EQUIPMENT COVER

## (undated) DEVICE — KIT C.A.T.S. FAST START

## (undated) DEVICE — TUBING SILICON CLR 3/16IN 10FT

## (undated) DEVICE — ELECTRODE BLADE E-Z CLEAN 4IN

## (undated) DEVICE — ADHESIVE MASTISOL VIAL 48/BX

## (undated) DEVICE — GLOVE PROTEXIS HYDROGEL SZ9

## (undated) DEVICE — GOWN X-LG STERILE BACK

## (undated) DEVICE — SUT VICRYL 4-0 18 P-3

## (undated) DEVICE — DRAPE STERI U-SHAPED 47X51IN

## (undated) DEVICE — INSTRUMENT FRAZIER 10FR W/VENT

## (undated) DEVICE — DRAPE ORTH SPLIT 77X108IN

## (undated) DEVICE — ELECTRODE REM POLYHESIVE II

## (undated) DEVICE — SUT VICRYL PLUS 0 CT-1 18IN

## (undated) DEVICE — DRESSING TELFA + RECT 6X10IN

## (undated) DEVICE — SOL NACL IRR 1000ML BTL

## (undated) DEVICE — PROBE BALL TIP 2.3MM

## (undated) DEVICE — GLOVE PROTEXIS BLUE LATEX 8

## (undated) DEVICE — SUT VICRYL PLUS 3-0 X-1 18IN

## (undated) DEVICE — PILLOW HEAD REST

## (undated) DEVICE — DRAPE O-ARM STERILE

## (undated) DEVICE — DRAPE OPMI STERILE

## (undated) DEVICE — RESERVOIR JACKSON-PRATT 100CC

## (undated) DEVICE — APPLICATOR CHLORAPREP ORN 26ML

## (undated) DEVICE — BUR BONE CUT MICRO TPS 3X3.8MM

## (undated) DEVICE — PAD DERMAPROX XL 22X14X.5IN

## (undated) DEVICE — GLOVE PROTEXIS BLUE LATEX 9

## (undated) DEVICE — SUT PROLENE 6-0 BV-1 30IN

## (undated) DEVICE — COVER FULLGUARD SHOE HIGH-TOP

## (undated) DEVICE — SHEET AVIENE MICFIB 1.4X1.4IN

## (undated) DEVICE — SPHERE NDI PASSIVE

## (undated) DEVICE — RESERVOIR (FOR C A T S)

## (undated) DEVICE — NDL HYPO 22GX1 1/2 SYR 10ML LL

## (undated) DEVICE — DRAIN ROUND SUCTION 10FR

## (undated) DEVICE — GLOVE PROTEXIS LTX MICRO  7.5

## (undated) DEVICE — BLADE EZ CLEAN 2 1/2

## (undated) DEVICE — SUT BONE WAX 2.5 GRMS 12/BX

## (undated) DEVICE — KIT SURGICAL TURNOVER

## (undated) DEVICE — GOWN SMARTGOWN 3XL XLONG

## (undated) DEVICE — ELECTRODE PATIENT RETURN DISP

## (undated) DEVICE — KIT POS JACKSON TABLE NO HDRST

## (undated) DEVICE — GLOVE SIGNATURE MICRO LTX 6